# Patient Record
Sex: MALE | Race: WHITE | Employment: UNEMPLOYED | URBAN - METROPOLITAN AREA
[De-identification: names, ages, dates, MRNs, and addresses within clinical notes are randomized per-mention and may not be internally consistent; named-entity substitution may affect disease eponyms.]

---

## 2017-12-04 ENCOUNTER — HOSPITAL ENCOUNTER (INPATIENT)
Age: 42
LOS: 9 days | Discharge: NURSING, REHAB OR LONG-TERM CARE | DRG: 728 | End: 2017-12-13
Attending: FAMILY MEDICINE | Admitting: FAMILY MEDICINE
Payer: COMMERCIAL

## 2017-12-04 PROBLEM — N49.2 ABSCESS, SCROTUM: Status: ACTIVE | Noted: 2017-12-04

## 2017-12-04 LAB
ALBUMIN SERPL-MCNC: 3.4 G/DL (ref 3.5–5.2)
ALBUMIN/GLOBULIN RATIO: 0.9 (ref 1–2.5)
ALP BLD-CCNC: 67 U/L (ref 40–129)
ALT SERPL-CCNC: 20 U/L (ref 5–41)
ANION GAP SERPL CALCULATED.3IONS-SCNC: 15 MMOL/L (ref 9–17)
AST SERPL-CCNC: 15 U/L
BILIRUB SERPL-MCNC: 0.33 MG/DL (ref 0.3–1.2)
BUN BLDV-MCNC: 27 MG/DL (ref 6–20)
BUN/CREAT BLD: ABNORMAL (ref 9–20)
CALCIUM SERPL-MCNC: 8.1 MG/DL (ref 8.6–10.4)
CHLORIDE BLD-SCNC: 90 MMOL/L (ref 98–107)
CO2: 24 MMOL/L (ref 20–31)
CREAT SERPL-MCNC: 0.99 MG/DL (ref 0.7–1.2)
GFR AFRICAN AMERICAN: >60 ML/MIN
GFR NON-AFRICAN AMERICAN: >60 ML/MIN
GFR SERPL CREATININE-BSD FRML MDRD: ABNORMAL ML/MIN/{1.73_M2}
GFR SERPL CREATININE-BSD FRML MDRD: ABNORMAL ML/MIN/{1.73_M2}
GLUCOSE BLD-MCNC: 336 MG/DL (ref 70–99)
LACTIC ACID, WHOLE BLOOD: 1.8 MMOL/L (ref 0.7–2.1)
LACTIC ACID: NORMAL MMOL/L
POTASSIUM SERPL-SCNC: 4.3 MMOL/L (ref 3.7–5.3)
SODIUM BLD-SCNC: 129 MMOL/L (ref 135–144)
TOTAL PROTEIN: 7.4 G/DL (ref 6.4–8.3)
TROPONIN INTERP: NORMAL
TROPONIN T: <0.03 NG/ML

## 2017-12-04 PROCEDURE — 83036 HEMOGLOBIN GLYCOSYLATED A1C: CPT

## 2017-12-04 PROCEDURE — 1200000000 HC SEMI PRIVATE

## 2017-12-04 PROCEDURE — 80053 COMPREHEN METABOLIC PANEL: CPT

## 2017-12-04 PROCEDURE — 0T9B70Z DRAINAGE OF BLADDER WITH DRAINAGE DEVICE, VIA NATURAL OR ARTIFICIAL OPENING: ICD-10-PCS | Performed by: UROLOGY

## 2017-12-04 PROCEDURE — 36415 COLL VENOUS BLD VENIPUNCTURE: CPT

## 2017-12-04 PROCEDURE — 87040 BLOOD CULTURE FOR BACTERIA: CPT

## 2017-12-04 PROCEDURE — 83605 ASSAY OF LACTIC ACID: CPT

## 2017-12-04 PROCEDURE — 84484 ASSAY OF TROPONIN QUANT: CPT

## 2017-12-04 PROCEDURE — 6360000002 HC RX W HCPCS: Performed by: FAMILY MEDICINE

## 2017-12-04 PROCEDURE — 0V950ZZ DRAINAGE OF SCROTUM, OPEN APPROACH: ICD-10-PCS | Performed by: UROLOGY

## 2017-12-04 RX ORDER — SODIUM CHLORIDE 9 MG/ML
INJECTION, SOLUTION INTRAVENOUS CONTINUOUS
Status: DISCONTINUED | OUTPATIENT
Start: 2017-12-04 | End: 2017-12-05

## 2017-12-04 RX ORDER — MORPHINE SULFATE 2 MG/ML
2 INJECTION, SOLUTION INTRAMUSCULAR; INTRAVENOUS
Status: DISCONTINUED | OUTPATIENT
Start: 2017-12-04 | End: 2017-12-08

## 2017-12-04 RX ORDER — SODIUM CHLORIDE 0.9 % (FLUSH) 0.9 %
10 SYRINGE (ML) INJECTION EVERY 12 HOURS SCHEDULED
Status: DISCONTINUED | OUTPATIENT
Start: 2017-12-04 | End: 2017-12-13 | Stop reason: HOSPADM

## 2017-12-04 RX ORDER — HYDROCODONE BITARTRATE AND ACETAMINOPHEN 5; 325 MG/1; MG/1
2 TABLET ORAL EVERY 4 HOURS PRN
Status: DISCONTINUED | OUTPATIENT
Start: 2017-12-04 | End: 2017-12-05

## 2017-12-04 RX ORDER — HYDROCODONE BITARTRATE AND ACETAMINOPHEN 5; 325 MG/1; MG/1
1 TABLET ORAL EVERY 4 HOURS PRN
Status: DISCONTINUED | OUTPATIENT
Start: 2017-12-04 | End: 2017-12-05

## 2017-12-04 RX ORDER — ACETAMINOPHEN 325 MG/1
650 TABLET ORAL EVERY 4 HOURS PRN
Status: DISCONTINUED | OUTPATIENT
Start: 2017-12-04 | End: 2017-12-13 | Stop reason: HOSPADM

## 2017-12-04 RX ORDER — SODIUM CHLORIDE 0.9 % (FLUSH) 0.9 %
10 SYRINGE (ML) INJECTION PRN
Status: DISCONTINUED | OUTPATIENT
Start: 2017-12-04 | End: 2017-12-13 | Stop reason: HOSPADM

## 2017-12-04 RX ADMIN — MORPHINE SULFATE 2 MG: 2 INJECTION, SOLUTION INTRAMUSCULAR; INTRAVENOUS at 22:50

## 2017-12-04 ASSESSMENT — PAIN SCALES - GENERAL
PAINLEVEL_OUTOF10: 10
PAINLEVEL_OUTOF10: 10
PAINLEVEL_OUTOF10: 9

## 2017-12-04 ASSESSMENT — PAIN DESCRIPTION - ORIENTATION: ORIENTATION: RIGHT;LEFT

## 2017-12-04 ASSESSMENT — PAIN DESCRIPTION - PROGRESSION
CLINICAL_PROGRESSION: GRADUALLY WORSENING

## 2017-12-04 ASSESSMENT — PAIN DESCRIPTION - ONSET: ONSET: ON-GOING

## 2017-12-04 ASSESSMENT — PAIN DESCRIPTION - PAIN TYPE: TYPE: ACUTE PAIN

## 2017-12-04 ASSESSMENT — PAIN DESCRIPTION - LOCATION: LOCATION: SCROTUM

## 2017-12-04 ASSESSMENT — PAIN DESCRIPTION - FREQUENCY: FREQUENCY: CONTINUOUS

## 2017-12-04 ASSESSMENT — PAIN DESCRIPTION - DESCRIPTORS: DESCRIPTORS: STABBING

## 2017-12-05 ENCOUNTER — ANESTHESIA (OUTPATIENT)
Dept: OPERATING ROOM | Age: 42
DRG: 728 | End: 2017-12-05
Payer: COMMERCIAL

## 2017-12-05 ENCOUNTER — ANESTHESIA EVENT (OUTPATIENT)
Dept: OPERATING ROOM | Age: 42
DRG: 728 | End: 2017-12-05
Payer: COMMERCIAL

## 2017-12-05 VITALS — SYSTOLIC BLOOD PRESSURE: 127 MMHG | DIASTOLIC BLOOD PRESSURE: 52 MMHG | OXYGEN SATURATION: 98 %

## 2017-12-05 PROBLEM — E66.01 MORBID OBESITY WITH BMI OF 70 AND OVER, ADULT (HCC): Status: ACTIVE | Noted: 2017-12-05

## 2017-12-05 PROBLEM — J41.0 SIMPLE CHRONIC BRONCHITIS (HCC): Status: ACTIVE | Noted: 2017-12-05

## 2017-12-05 LAB
ABSOLUTE EOS #: 1.45 K/UL (ref 0–0.4)
ABSOLUTE IMMATURE GRANULOCYTE: 0.15 K/UL (ref 0–0.3)
ABSOLUTE LYMPH #: 0.87 K/UL (ref 1–4.8)
ABSOLUTE MONO #: 0.73 K/UL (ref 0.1–0.8)
ALBUMIN SERPL-MCNC: 3.1 G/DL (ref 3.5–5.2)
ALBUMIN/GLOBULIN RATIO: 0.7 (ref 1–2.5)
ALP BLD-CCNC: 65 U/L (ref 40–129)
ALT SERPL-CCNC: 18 U/L (ref 5–41)
ANION GAP SERPL CALCULATED.3IONS-SCNC: 15 MMOL/L (ref 9–17)
AST SERPL-CCNC: 14 U/L
BASOPHILS # BLD: 1 % (ref 0–2)
BASOPHILS ABSOLUTE: 0.15 K/UL (ref 0–0.2)
BILIRUB SERPL-MCNC: 0.51 MG/DL (ref 0.3–1.2)
BUN BLDV-MCNC: 28 MG/DL (ref 6–20)
BUN/CREAT BLD: ABNORMAL (ref 9–20)
CALCIUM IONIZED: 0.98 MMOL/L (ref 1.13–1.33)
CALCIUM SERPL-MCNC: 8 MG/DL (ref 8.6–10.4)
CHLORIDE BLD-SCNC: 94 MMOL/L (ref 98–107)
CO2: 23 MMOL/L (ref 20–31)
CREAT SERPL-MCNC: 0.98 MG/DL (ref 0.7–1.2)
DIFFERENTIAL TYPE: ABNORMAL
EOSINOPHILS RELATIVE PERCENT: 10 % (ref 1–4)
ESTIMATED AVERAGE GLUCOSE: 306 MG/DL
GFR AFRICAN AMERICAN: >60 ML/MIN
GFR NON-AFRICAN AMERICAN: >60 ML/MIN
GFR SERPL CREATININE-BSD FRML MDRD: ABNORMAL ML/MIN/{1.73_M2}
GFR SERPL CREATININE-BSD FRML MDRD: ABNORMAL ML/MIN/{1.73_M2}
GLUCOSE BLD-MCNC: 292 MG/DL (ref 75–110)
GLUCOSE BLD-MCNC: 306 MG/DL (ref 75–110)
GLUCOSE BLD-MCNC: 317 MG/DL (ref 75–110)
GLUCOSE BLD-MCNC: 336 MG/DL (ref 75–110)
GLUCOSE BLD-MCNC: 347 MG/DL (ref 70–99)
GLUCOSE BLD-MCNC: 355 MG/DL (ref 75–110)
GLUCOSE BLD-MCNC: 359 MG/DL (ref 75–110)
HBA1C MFR BLD: 12.3 % (ref 4–6)
HCT VFR BLD CALC: 37.4 % (ref 40.7–50.3)
HEMOGLOBIN: 12 G/DL (ref 13–17)
IMMATURE GRANULOCYTES: 1 %
INR BLD: 1.1
LACTIC ACID, WHOLE BLOOD: 1.2 MMOL/L (ref 0.7–2.1)
LACTIC ACID, WHOLE BLOOD: 1.3 MMOL/L (ref 0.7–2.1)
LACTIC ACID, WHOLE BLOOD: 1.5 MMOL/L (ref 0.7–2.1)
LACTIC ACID, WHOLE BLOOD: 1.7 MMOL/L (ref 0.7–2.1)
LACTIC ACID, WHOLE BLOOD: 2.3 MMOL/L (ref 0.7–2.1)
LACTIC ACID: ABNORMAL MMOL/L
LACTIC ACID: NORMAL MMOL/L
LYMPHOCYTES # BLD: 6 % (ref 24–44)
MAGNESIUM: 1.6 MG/DL (ref 1.6–2.6)
MCH RBC QN AUTO: 29.7 PG (ref 25.2–33.5)
MCHC RBC AUTO-ENTMCNC: 32.1 G/DL (ref 28.4–34.8)
MCV RBC AUTO: 92.6 FL (ref 82.6–102.9)
MONOCYTES # BLD: 5 % (ref 1–7)
MORPHOLOGY: NORMAL
PDW BLD-RTO: 12.5 % (ref 11.8–14.4)
PLATELET # BLD: 278 K/UL (ref 138–453)
PLATELET ESTIMATE: ABNORMAL
PMV BLD AUTO: 10.2 FL (ref 8.1–13.5)
POTASSIUM SERPL-SCNC: 4.2 MMOL/L (ref 3.7–5.3)
PROTHROMBIN TIME: 11.8 SEC (ref 9.4–12.6)
RBC # BLD: 4.04 M/UL (ref 4.21–5.77)
RBC # BLD: ABNORMAL 10*6/UL
SEG NEUTROPHILS: 77 % (ref 36–66)
SEGMENTED NEUTROPHILS ABSOLUTE COUNT: 11.15 K/UL (ref 1.8–7.7)
SODIUM BLD-SCNC: 132 MMOL/L (ref 135–144)
TOTAL PROTEIN: 7.3 G/DL (ref 6.4–8.3)
TROPONIN INTERP: NORMAL
TROPONIN T: <0.03 NG/ML
WBC # BLD: 14.5 K/UL (ref 3.5–11.3)
WBC # BLD: ABNORMAL 10*3/UL

## 2017-12-05 PROCEDURE — 2500000003 HC RX 250 WO HCPCS: Performed by: NURSE ANESTHETIST, CERTIFIED REGISTERED

## 2017-12-05 PROCEDURE — 87075 CULTR BACTERIA EXCEPT BLOOD: CPT

## 2017-12-05 PROCEDURE — 2500000003 HC RX 250 WO HCPCS: Performed by: UROLOGY

## 2017-12-05 PROCEDURE — 6370000000 HC RX 637 (ALT 250 FOR IP): Performed by: NURSE PRACTITIONER

## 2017-12-05 PROCEDURE — 3700000001 HC ADD 15 MINUTES (ANESTHESIA): Performed by: UROLOGY

## 2017-12-05 PROCEDURE — 6360000002 HC RX W HCPCS: Performed by: ANESTHESIOLOGY

## 2017-12-05 PROCEDURE — 7100000001 HC PACU RECOVERY - ADDTL 15 MIN: Performed by: UROLOGY

## 2017-12-05 PROCEDURE — 84484 ASSAY OF TROPONIN QUANT: CPT

## 2017-12-05 PROCEDURE — 85610 PROTHROMBIN TIME: CPT

## 2017-12-05 PROCEDURE — 82330 ASSAY OF CALCIUM: CPT

## 2017-12-05 PROCEDURE — 2580000003 HC RX 258: Performed by: FAMILY MEDICINE

## 2017-12-05 PROCEDURE — 94762 N-INVAS EAR/PLS OXIMTRY CONT: CPT

## 2017-12-05 PROCEDURE — 3700000000 HC ANESTHESIA ATTENDED CARE: Performed by: UROLOGY

## 2017-12-05 PROCEDURE — A6446 CONFORM BAND S W>=3" <5"/YD: HCPCS | Performed by: UROLOGY

## 2017-12-05 PROCEDURE — A6222 GAUZE <=16 IN NO W/SAL W/O B: HCPCS | Performed by: UROLOGY

## 2017-12-05 PROCEDURE — 6370000000 HC RX 637 (ALT 250 FOR IP): Performed by: FAMILY MEDICINE

## 2017-12-05 PROCEDURE — 6370000000 HC RX 637 (ALT 250 FOR IP): Performed by: ANESTHESIOLOGY

## 2017-12-05 PROCEDURE — 87070 CULTURE OTHR SPECIMN AEROBIC: CPT

## 2017-12-05 PROCEDURE — 83605 ASSAY OF LACTIC ACID: CPT

## 2017-12-05 PROCEDURE — 86403 PARTICLE AGGLUT ANTBDY SCRN: CPT

## 2017-12-05 PROCEDURE — 87205 SMEAR GRAM STAIN: CPT

## 2017-12-05 PROCEDURE — 6360000002 HC RX W HCPCS: Performed by: NURSE ANESTHETIST, CERTIFIED REGISTERED

## 2017-12-05 PROCEDURE — 6360000002 HC RX W HCPCS: Performed by: FAMILY MEDICINE

## 2017-12-05 PROCEDURE — 85025 COMPLETE CBC W/AUTO DIFF WBC: CPT

## 2017-12-05 PROCEDURE — 87086 URINE CULTURE/COLONY COUNT: CPT

## 2017-12-05 PROCEDURE — 2580000003 HC RX 258: Performed by: NURSE ANESTHETIST, CERTIFIED REGISTERED

## 2017-12-05 PROCEDURE — 36415 COLL VENOUS BLD VENIPUNCTURE: CPT

## 2017-12-05 PROCEDURE — 99222 1ST HOSP IP/OBS MODERATE 55: CPT | Performed by: FAMILY MEDICINE

## 2017-12-05 PROCEDURE — 94660 CPAP INITIATION&MGMT: CPT

## 2017-12-05 PROCEDURE — 3600000015 HC SURGERY LEVEL 5 ADDTL 15MIN: Performed by: UROLOGY

## 2017-12-05 PROCEDURE — 3600000005 HC SURGERY LEVEL 5 BASE: Performed by: UROLOGY

## 2017-12-05 PROCEDURE — 7100000000 HC PACU RECOVERY - FIRST 15 MIN: Performed by: UROLOGY

## 2017-12-05 PROCEDURE — 82947 ASSAY GLUCOSE BLOOD QUANT: CPT

## 2017-12-05 PROCEDURE — 80053 COMPREHEN METABOLIC PANEL: CPT

## 2017-12-05 PROCEDURE — 1200000000 HC SEMI PRIVATE

## 2017-12-05 PROCEDURE — 83735 ASSAY OF MAGNESIUM: CPT

## 2017-12-05 RX ORDER — OXYCODONE AND ACETAMINOPHEN 7.5; 325 MG/1; MG/1
1 TABLET ORAL EVERY 8 HOURS PRN
Status: DISCONTINUED | OUTPATIENT
Start: 2017-12-05 | End: 2017-12-05 | Stop reason: SDUPTHER

## 2017-12-05 RX ORDER — INSULIN GLARGINE 100 [IU]/ML
55 INJECTION, SOLUTION SUBCUTANEOUS 2 TIMES DAILY
Status: DISCONTINUED | OUTPATIENT
Start: 2017-12-05 | End: 2017-12-06

## 2017-12-05 RX ORDER — ATENOLOL 50 MG/1
50 TABLET ORAL DAILY
Status: DISCONTINUED | OUTPATIENT
Start: 2017-12-05 | End: 2017-12-05

## 2017-12-05 RX ORDER — METOLAZONE 2.5 MG/1
2.5 TABLET ORAL DAILY
Status: DISCONTINUED | OUTPATIENT
Start: 2017-12-05 | End: 2017-12-13 | Stop reason: HOSPADM

## 2017-12-05 RX ORDER — DILTIAZEM HYDROCHLORIDE 240 MG/1
240 CAPSULE, COATED, EXTENDED RELEASE ORAL DAILY
Status: DISCONTINUED | OUTPATIENT
Start: 2017-12-05 | End: 2017-12-08

## 2017-12-05 RX ORDER — ALBUTEROL SULFATE 90 UG/1
2 AEROSOL, METERED RESPIRATORY (INHALATION) EVERY 4 HOURS PRN
COMMUNITY

## 2017-12-05 RX ORDER — IPRATROPIUM BROMIDE AND ALBUTEROL SULFATE 2.5; .5 MG/3ML; MG/3ML
1 SOLUTION RESPIRATORY (INHALATION) 4 TIMES DAILY
COMMUNITY

## 2017-12-05 RX ORDER — DIPHENHYDRAMINE HYDROCHLORIDE 50 MG/ML
12.5 INJECTION INTRAMUSCULAR; INTRAVENOUS
Status: DISCONTINUED | OUTPATIENT
Start: 2017-12-05 | End: 2017-12-05 | Stop reason: HOSPADM

## 2017-12-05 RX ORDER — OXYCODONE HYDROCHLORIDE AND ACETAMINOPHEN 5; 325 MG/1; MG/1
2 TABLET ORAL EVERY 4 HOURS PRN
Status: DISCONTINUED | OUTPATIENT
Start: 2017-12-05 | End: 2017-12-13 | Stop reason: HOSPADM

## 2017-12-05 RX ORDER — DEXTROSE MONOHYDRATE 50 MG/ML
100 INJECTION, SOLUTION INTRAVENOUS PRN
Status: DISCONTINUED | OUTPATIENT
Start: 2017-12-05 | End: 2017-12-13 | Stop reason: HOSPADM

## 2017-12-05 RX ORDER — DILTIAZEM HYDROCHLORIDE 240 MG/1
240 CAPSULE, COATED, EXTENDED RELEASE ORAL DAILY
Status: DISCONTINUED | OUTPATIENT
Start: 2017-12-05 | End: 2017-12-05

## 2017-12-05 RX ORDER — DEXTROSE MONOHYDRATE 25 G/50ML
12.5 INJECTION, SOLUTION INTRAVENOUS PRN
Status: DISCONTINUED | OUTPATIENT
Start: 2017-12-05 | End: 2017-12-13 | Stop reason: HOSPADM

## 2017-12-05 RX ORDER — MIDAZOLAM HYDROCHLORIDE 1 MG/ML
INJECTION INTRAMUSCULAR; INTRAVENOUS PRN
Status: DISCONTINUED | OUTPATIENT
Start: 2017-12-05 | End: 2017-12-05 | Stop reason: SDUPTHER

## 2017-12-05 RX ORDER — CALCIUM CARBONATE 200(500)MG
500 TABLET,CHEWABLE ORAL 3 TIMES DAILY PRN
Status: DISCONTINUED | OUTPATIENT
Start: 2017-12-05 | End: 2017-12-13 | Stop reason: HOSPADM

## 2017-12-05 RX ORDER — FENTANYL CITRATE 50 UG/ML
INJECTION, SOLUTION INTRAMUSCULAR; INTRAVENOUS PRN
Status: DISCONTINUED | OUTPATIENT
Start: 2017-12-05 | End: 2017-12-05 | Stop reason: SDUPTHER

## 2017-12-05 RX ORDER — DILTIAZEM HYDROCHLORIDE 240 MG/1
240 CAPSULE, COATED, EXTENDED RELEASE ORAL DAILY
COMMUNITY
End: 2022-09-22

## 2017-12-05 RX ORDER — ALBUTEROL SULFATE 2.5 MG/3ML
2.5 SOLUTION RESPIRATORY (INHALATION) EVERY 4 HOURS
COMMUNITY

## 2017-12-05 RX ORDER — SODIUM CHLORIDE, SODIUM LACTATE, POTASSIUM CHLORIDE, CALCIUM CHLORIDE 600; 310; 30; 20 MG/100ML; MG/100ML; MG/100ML; MG/100ML
INJECTION, SOLUTION INTRAVENOUS CONTINUOUS PRN
Status: DISCONTINUED | OUTPATIENT
Start: 2017-12-05 | End: 2017-12-05 | Stop reason: SDUPTHER

## 2017-12-05 RX ORDER — METOLAZONE 2.5 MG/1
2.5 TABLET ORAL DAILY
COMMUNITY
End: 2022-09-22

## 2017-12-05 RX ORDER — PROPOFOL 10 MG/ML
INJECTION, EMULSION INTRAVENOUS CONTINUOUS PRN
Status: DISCONTINUED | OUTPATIENT
Start: 2017-12-05 | End: 2017-12-05 | Stop reason: SDUPTHER

## 2017-12-05 RX ORDER — FUROSEMIDE 40 MG/1
40 TABLET ORAL 2 TIMES DAILY
Status: DISCONTINUED | OUTPATIENT
Start: 2017-12-05 | End: 2017-12-13 | Stop reason: HOSPADM

## 2017-12-05 RX ORDER — INSULIN GLARGINE 100 [IU]/ML
50 INJECTION, SOLUTION SUBCUTANEOUS 2 TIMES DAILY
Status: DISCONTINUED | OUTPATIENT
Start: 2017-12-05 | End: 2017-12-05

## 2017-12-05 RX ORDER — OXYCODONE HYDROCHLORIDE AND ACETAMINOPHEN 5; 325 MG/1; MG/1
1 TABLET ORAL EVERY 4 HOURS PRN
Status: DISCONTINUED | OUTPATIENT
Start: 2017-12-05 | End: 2017-12-13 | Stop reason: HOSPADM

## 2017-12-05 RX ORDER — CETIRIZINE HYDROCHLORIDE, PSEUDOEPHEDRINE HYDROCHLORIDE 5; 120 MG/1; MG/1
1 TABLET, FILM COATED, EXTENDED RELEASE ORAL 2 TIMES DAILY
COMMUNITY

## 2017-12-05 RX ORDER — POTASSIUM CHLORIDE 20 MEQ/1
20 TABLET, EXTENDED RELEASE ORAL 2 TIMES DAILY
COMMUNITY
End: 2022-09-22

## 2017-12-05 RX ORDER — PROMETHAZINE HYDROCHLORIDE 25 MG/ML
6.25 INJECTION, SOLUTION INTRAMUSCULAR; INTRAVENOUS
Status: DISCONTINUED | OUTPATIENT
Start: 2017-12-05 | End: 2017-12-05 | Stop reason: HOSPADM

## 2017-12-05 RX ORDER — HYDROCODONE BITARTRATE AND ACETAMINOPHEN 5; 325 MG/1; MG/1
1 TABLET ORAL EVERY 6 HOURS PRN
Status: ON HOLD | COMMUNITY
End: 2017-12-05

## 2017-12-05 RX ORDER — LABETALOL HYDROCHLORIDE 5 MG/ML
INJECTION, SOLUTION INTRAVENOUS PRN
Status: DISCONTINUED | OUTPATIENT
Start: 2017-12-05 | End: 2017-12-05 | Stop reason: SDUPTHER

## 2017-12-05 RX ORDER — KETAMINE HYDROCHLORIDE 10 MG/ML
INJECTION, SOLUTION INTRAMUSCULAR; INTRAVENOUS PRN
Status: DISCONTINUED | OUTPATIENT
Start: 2017-12-05 | End: 2017-12-05 | Stop reason: SDUPTHER

## 2017-12-05 RX ORDER — FENTANYL CITRATE 50 UG/ML
50 INJECTION, SOLUTION INTRAMUSCULAR; INTRAVENOUS EVERY 5 MIN PRN
Status: DISCONTINUED | OUTPATIENT
Start: 2017-12-05 | End: 2017-12-05 | Stop reason: HOSPADM

## 2017-12-05 RX ORDER — FUROSEMIDE 40 MG/1
40 TABLET ORAL 2 TIMES DAILY
COMMUNITY
End: 2022-09-22

## 2017-12-05 RX ORDER — OXYCODONE AND ACETAMINOPHEN 7.5; 325 MG/1; MG/1
1 TABLET ORAL EVERY 8 HOURS PRN
Status: ON HOLD | COMMUNITY
End: 2017-12-08

## 2017-12-05 RX ORDER — LIDOCAINE HYDROCHLORIDE 10 MG/ML
INJECTION, SOLUTION EPIDURAL; INFILTRATION; INTRACAUDAL; PERINEURAL PRN
Status: DISCONTINUED | OUTPATIENT
Start: 2017-12-05 | End: 2017-12-05 | Stop reason: HOSPADM

## 2017-12-05 RX ORDER — LOSARTAN POTASSIUM 50 MG/1
100 TABLET ORAL DAILY
Status: DISCONTINUED | OUTPATIENT
Start: 2017-12-05 | End: 2017-12-13 | Stop reason: HOSPADM

## 2017-12-05 RX ORDER — ATENOLOL 50 MG/1
50 TABLET ORAL DAILY
Status: DISCONTINUED | OUTPATIENT
Start: 2017-12-05 | End: 2017-12-07

## 2017-12-05 RX ORDER — INSULIN GLARGINE 100 [IU]/ML
50 INJECTION, SOLUTION SUBCUTANEOUS 2 TIMES DAILY
Status: ON HOLD | COMMUNITY
End: 2017-12-08 | Stop reason: HOSPADM

## 2017-12-05 RX ORDER — ATENOLOL 50 MG/1
50 TABLET ORAL DAILY
Status: ON HOLD | COMMUNITY
End: 2017-12-08 | Stop reason: HOSPADM

## 2017-12-05 RX ORDER — LOSARTAN POTASSIUM 100 MG/1
100 TABLET ORAL DAILY
COMMUNITY
End: 2022-09-22

## 2017-12-05 RX ORDER — ONDANSETRON 2 MG/ML
4 INJECTION INTRAMUSCULAR; INTRAVENOUS
Status: DISCONTINUED | OUTPATIENT
Start: 2017-12-05 | End: 2017-12-05 | Stop reason: HOSPADM

## 2017-12-05 RX ORDER — FENTANYL CITRATE 50 UG/ML
25 INJECTION, SOLUTION INTRAMUSCULAR; INTRAVENOUS EVERY 5 MIN PRN
Status: DISCONTINUED | OUTPATIENT
Start: 2017-12-05 | End: 2017-12-05 | Stop reason: HOSPADM

## 2017-12-05 RX ORDER — POTASSIUM CHLORIDE 20 MEQ/1
20 TABLET, EXTENDED RELEASE ORAL 2 TIMES DAILY
Status: DISCONTINUED | OUTPATIENT
Start: 2017-12-05 | End: 2017-12-13 | Stop reason: HOSPADM

## 2017-12-05 RX ORDER — NICOTINE POLACRILEX 4 MG
15 LOZENGE BUCCAL PRN
Status: DISCONTINUED | OUTPATIENT
Start: 2017-12-05 | End: 2017-12-13 | Stop reason: HOSPADM

## 2017-12-05 RX ADMIN — MORPHINE SULFATE 2 MG: 2 INJECTION, SOLUTION INTRAMUSCULAR; INTRAVENOUS at 18:38

## 2017-12-05 RX ADMIN — MORPHINE SULFATE 2 MG: 2 INJECTION, SOLUTION INTRAMUSCULAR; INTRAVENOUS at 07:32

## 2017-12-05 RX ADMIN — ATENOLOL 50 MG: 50 TABLET ORAL at 21:57

## 2017-12-05 RX ADMIN — FENTANYL CITRATE 50 MCG: 50 INJECTION INTRAMUSCULAR; INTRAVENOUS at 15:27

## 2017-12-05 RX ADMIN — VANCOMYCIN HYDROCHLORIDE 1500 MG: 10 INJECTION, POWDER, LYOPHILIZED, FOR SOLUTION INTRAVENOUS at 01:42

## 2017-12-05 RX ADMIN — MORPHINE SULFATE 2 MG: 2 INJECTION, SOLUTION INTRAMUSCULAR; INTRAVENOUS at 10:11

## 2017-12-05 RX ADMIN — MORPHINE SULFATE 2 MG: 2 INJECTION, SOLUTION INTRAMUSCULAR; INTRAVENOUS at 01:43

## 2017-12-05 RX ADMIN — MORPHINE SULFATE 2 MG: 2 INJECTION, SOLUTION INTRAMUSCULAR; INTRAVENOUS at 21:55

## 2017-12-05 RX ADMIN — FENTANYL CITRATE 25 MCG: 50 INJECTION INTRAMUSCULAR; INTRAVENOUS at 15:44

## 2017-12-05 RX ADMIN — FENTANYL CITRATE 25 MCG: 50 INJECTION INTRAMUSCULAR; INTRAVENOUS at 14:15

## 2017-12-05 RX ADMIN — SODIUM CHLORIDE, POTASSIUM CHLORIDE, SODIUM LACTATE AND CALCIUM CHLORIDE: 600; 310; 30; 20 INJECTION, SOLUTION INTRAVENOUS at 13:49

## 2017-12-05 RX ADMIN — MEROPENEM 1 G: 1 INJECTION, POWDER, FOR SOLUTION INTRAVENOUS at 07:43

## 2017-12-05 RX ADMIN — OXYCODONE HYDROCHLORIDE AND ACETAMINOPHEN 2 TABLET: 5; 325 TABLET ORAL at 01:14

## 2017-12-05 RX ADMIN — DILTIAZEM HYDROCHLORIDE 240 MG: 240 CAPSULE, COATED, EXTENDED RELEASE ORAL at 21:58

## 2017-12-05 RX ADMIN — LABETALOL HYDROCHLORIDE 5 MG: 5 INJECTION, SOLUTION INTRAVENOUS at 14:02

## 2017-12-05 RX ADMIN — FENTANYL CITRATE 50 MCG: 50 INJECTION INTRAMUSCULAR; INTRAVENOUS at 13:46

## 2017-12-05 RX ADMIN — INSULIN LISPRO 4 UNITS: 100 INJECTION, SOLUTION INTRAVENOUS; SUBCUTANEOUS at 01:43

## 2017-12-05 RX ADMIN — KETAMINE HYDROCHLORIDE 50 MG: 10 INJECTION INTRAMUSCULAR; INTRAVENOUS at 14:13

## 2017-12-05 RX ADMIN — FENTANYL CITRATE 25 MCG: 50 INJECTION INTRAMUSCULAR; INTRAVENOUS at 14:23

## 2017-12-05 RX ADMIN — KETAMINE HYDROCHLORIDE 50 MG: 10 INJECTION INTRAMUSCULAR; INTRAVENOUS at 13:53

## 2017-12-05 RX ADMIN — ANTACID TABLETS 500 MG: 500 TABLET, CHEWABLE ORAL at 22:21

## 2017-12-05 RX ADMIN — MIDAZOLAM HYDROCHLORIDE 1 MG: 1 INJECTION, SOLUTION INTRAMUSCULAR; INTRAVENOUS at 13:48

## 2017-12-05 RX ADMIN — VANCOMYCIN HYDROCHLORIDE 1500 MG: 10 INJECTION, POWDER, LYOPHILIZED, FOR SOLUTION INTRAVENOUS at 16:48

## 2017-12-05 RX ADMIN — INSULIN GLARGINE 50 UNITS: 100 INJECTION, SOLUTION SUBCUTANEOUS at 09:57

## 2017-12-05 RX ADMIN — MORPHINE SULFATE 2 MG: 2 INJECTION, SOLUTION INTRAMUSCULAR; INTRAVENOUS at 12:15

## 2017-12-05 RX ADMIN — Medication 10 ML: at 01:01

## 2017-12-05 RX ADMIN — MEROPENEM 1 G: 1 INJECTION, POWDER, FOR SOLUTION INTRAVENOUS at 21:56

## 2017-12-05 RX ADMIN — FENTANYL CITRATE 50 MCG: 50 INJECTION INTRAMUSCULAR; INTRAVENOUS at 16:14

## 2017-12-05 RX ADMIN — OXYCODONE HYDROCHLORIDE AND ACETAMINOPHEN 2 TABLET: 5; 325 TABLET ORAL at 16:59

## 2017-12-05 RX ADMIN — POTASSIUM CHLORIDE 20 MEQ: 20 TABLET, EXTENDED RELEASE ORAL at 22:02

## 2017-12-05 RX ADMIN — INSULIN LISPRO 4 UNITS: 100 INJECTION, SOLUTION INTRAVENOUS; SUBCUTANEOUS at 16:59

## 2017-12-05 RX ADMIN — PROPOFOL 50 MCG/KG/MIN: 10 INJECTION, EMULSION INTRAVENOUS at 13:53

## 2017-12-05 RX ADMIN — FENTANYL CITRATE 50 MCG: 50 INJECTION INTRAMUSCULAR; INTRAVENOUS at 15:14

## 2017-12-05 RX ADMIN — OXYCODONE HYDROCHLORIDE AND ACETAMINOPHEN 2 TABLET: 5; 325 TABLET ORAL at 21:55

## 2017-12-05 RX ADMIN — INSULIN LISPRO 10 UNITS: 100 INJECTION, SOLUTION INTRAVENOUS; SUBCUTANEOUS at 09:56

## 2017-12-05 RX ADMIN — FUROSEMIDE 40 MG: 40 TABLET ORAL at 21:58

## 2017-12-05 RX ADMIN — SODIUM CHLORIDE: 9 INJECTION, SOLUTION INTRAVENOUS at 01:00

## 2017-12-05 RX ADMIN — INSULIN HUMAN 10 UNITS: 100 INJECTION, SOLUTION PARENTERAL at 15:39

## 2017-12-05 RX ADMIN — Medication 10 ML: at 22:03

## 2017-12-05 RX ADMIN — VANCOMYCIN HYDROCHLORIDE 1500 MG: 10 INJECTION, POWDER, LYOPHILIZED, FOR SOLUTION INTRAVENOUS at 10:11

## 2017-12-05 RX ADMIN — LABETALOL HYDROCHLORIDE 10 MG: 5 INJECTION, SOLUTION INTRAVENOUS at 14:05

## 2017-12-05 RX ADMIN — LINAGLIPTIN 5 MG: 5 TABLET, FILM COATED ORAL at 19:26

## 2017-12-05 RX ADMIN — OXYCODONE HYDROCHLORIDE AND ACETAMINOPHEN 2 TABLET: 5; 325 TABLET ORAL at 05:16

## 2017-12-05 RX ADMIN — ENOXAPARIN SODIUM 40 MG: 40 INJECTION SUBCUTANEOUS at 21:56

## 2017-12-05 RX ADMIN — MEROPENEM 1 G: 1 INJECTION, POWDER, FOR SOLUTION INTRAVENOUS at 01:01

## 2017-12-05 RX ADMIN — INSULIN LISPRO 5 UNITS: 100 INJECTION, SOLUTION INTRAVENOUS; SUBCUTANEOUS at 21:57

## 2017-12-05 RX ADMIN — INSULIN GLARGINE 55 UNITS: 100 INJECTION, SOLUTION SUBCUTANEOUS at 21:56

## 2017-12-05 ASSESSMENT — PULMONARY FUNCTION TESTS
PIF_VALUE: 1

## 2017-12-05 ASSESSMENT — PAIN DESCRIPTION - PAIN TYPE
TYPE: ACUTE PAIN

## 2017-12-05 ASSESSMENT — PAIN DESCRIPTION - PROGRESSION
CLINICAL_PROGRESSION: GRADUALLY WORSENING
CLINICAL_PROGRESSION: NOT CHANGED
CLINICAL_PROGRESSION: GRADUALLY WORSENING

## 2017-12-05 ASSESSMENT — ENCOUNTER SYMPTOMS
SORE THROAT: 0
WHEEZING: 0
DIARRHEA: 0
CONSTIPATION: 0
SHORTNESS OF BREATH: 0
ABDOMINAL PAIN: 0
BLOOD IN STOOL: 0
VOICE CHANGE: 0
VOMITING: 0
COUGH: 0
SHORTNESS OF BREATH: 0
SINUS PRESSURE: 0
NAUSEA: 0

## 2017-12-05 ASSESSMENT — PAIN SCALES - GENERAL
PAINLEVEL_OUTOF10: 6
PAINLEVEL_OUTOF10: 6
PAINLEVEL_OUTOF10: 8
PAINLEVEL_OUTOF10: 9
PAINLEVEL_OUTOF10: 7
PAINLEVEL_OUTOF10: 6
PAINLEVEL_OUTOF10: 7
PAINLEVEL_OUTOF10: 8
PAINLEVEL_OUTOF10: 8
PAINLEVEL_OUTOF10: 10
PAINLEVEL_OUTOF10: 7
PAINLEVEL_OUTOF10: 5
PAINLEVEL_OUTOF10: 6
PAINLEVEL_OUTOF10: 6
PAINLEVEL_OUTOF10: 10
PAINLEVEL_OUTOF10: 8
PAINLEVEL_OUTOF10: 6
PAINLEVEL_OUTOF10: 7
PAINLEVEL_OUTOF10: 9
PAINLEVEL_OUTOF10: 10
PAINLEVEL_OUTOF10: 8

## 2017-12-05 ASSESSMENT — PAIN DESCRIPTION - DESCRIPTORS: DESCRIPTORS: ACHING;TENDER

## 2017-12-05 ASSESSMENT — PAIN - FUNCTIONAL ASSESSMENT: PAIN_FUNCTIONAL_ASSESSMENT: 0-10

## 2017-12-05 ASSESSMENT — PAIN DESCRIPTION - ONSET: ONSET: ON-GOING

## 2017-12-05 ASSESSMENT — PAIN DESCRIPTION - FREQUENCY: FREQUENCY: CONTINUOUS

## 2017-12-05 ASSESSMENT — PAIN DESCRIPTION - LOCATION
LOCATION: KNEE
LOCATION: KNEE
LOCATION: SCROTUM

## 2017-12-05 ASSESSMENT — PAIN DESCRIPTION - ORIENTATION
ORIENTATION: RIGHT;LEFT
ORIENTATION: RIGHT

## 2017-12-05 ASSESSMENT — LIFESTYLE VARIABLES: SMOKING_STATUS: 0

## 2017-12-05 NOTE — PROGRESS NOTES
Pharmacy Note  Vancomycin Consult    Deidra Schofield is a 36 y.o. male started on Vancomycin for scrotum abscess/sepsis; consult received from Dr. Kaya Lara  to manage therapy. Also receiving the following antibiotics: Meropenem. Patient Active Problem List   Diagnosis    Abscess, scrotum    Severe uncontrolled diabetes mellitus ([address]       Allergies:  Review of patient's allergies indicates not on file. Temp max: 98.2    Recent Labs      12/04/17   2258   BUN  27*       Recent Labs      12/04/17   2258   CREATININE  0.99       No results for input(s): WBC in the last 72 hours. No intake or output data in the 24 hours ending 12/05/17 0002    Culture Date      Source                       Results      Ht Readings from Last 1 Encounters:   12/04/17 6' 1\" (1.854 m)        Wt Readings from Last 1 Encounters:   12/04/17 (!) 574 lb 8 oz (260.6 kg)         Body mass index is 75.8 kg/m². Estimated Creatinine Clearance: 207 mL/min (based on SCr of 0.99 mg/dL). Assessment/Plan:  Patient received Vancomycin 2000 mg IV x1 dose at 16:49 on 12/4/17 from another medical facility per CRISTI Smallwood, Will initiate vancomycin 1500 mg IV every 8 hours. Timing of trough level will be determined based on culture results, renal function, and clinical response. Thank you for the consult. Will continue to follow. Velvet Prior Pharm. D.    12/5/2017  12:08 AM

## 2017-12-05 NOTE — CONSULTS
5-325 MG per tablet 1 tablet, 1 tablet, Oral, Q4H PRN **OR** oxyCODONE-acetaminophen (PERCOCET) 5-325 MG per tablet 2 tablet, 2 tablet, Oral, Q4H PRN, Dannie Chopra, NP    glucose (GLUTOSE) 40 % oral gel 15 g, 15 g, Oral, PRN, Dannie Riling, NP    dextrose 50 % solution 12.5 g, 12.5 g, Intravenous, PRN, Dannie Riling, NP    glucagon (rDNA) injection 1 mg, 1 mg, Intramuscular, PRN, Dannie Riling, NP    dextrose 5 % solution, 100 mL/hr, Intravenous, PRN, Dannie Riling, NP    insulin lispro (HUMALOG) injection vial 0-12 Units, 0-12 Units, Subcutaneous, TID WC, Dannie Chopra, NP    insulin lispro (HUMALOG) injection vial 0-6 Units, 0-6 Units, Subcutaneous, Nightly, Dannie Chopra, NP    0.9 % sodium chloride infusion, , Intravenous, Continuous, Hamida Song MD    sodium chloride flush 0.9 % injection 10 mL, 10 mL, Intravenous, 2 times per day, Hamida Song MD    sodium chloride flush 0.9 % injection 10 mL, 10 mL, Intravenous, PRN, Hamida Song MD    acetaminophen (TYLENOL) tablet 650 mg, 650 mg, Oral, Q4H PRN, Hamida Song MD    morphine injection 2 mg, 2 mg, Intravenous, Q2H PRN, Hamida Song MD, 2 mg at 12/04/17 2250    enoxaparin (LOVENOX) injection 40 mg, 40 mg, Subcutaneous, Daily, Hamida Song MD    meropenem (MERREM) 1 g in sterile water 20 mL IV syringe, 1 g, Intravenous, Q8H, Hamida Song MD    Allergies: Allergies not on file    Social History:   Social History     Social History    Marital status:      Spouse name: N/A    Number of children: N/A    Years of education: N/A     Occupational History    Not on file.      Social History Main Topics    Smoking status: Not on file    Smokeless tobacco: Not on file    Alcohol use Not on file    Drug use: Unknown    Sexual activity: Not on file     Other Topics Concern    Not on file     Social History Narrative    No narrative on file       Family History:  No family history on file. REVIEW OF SYSTEMS:  A comprehensive 14 point review of systems was obtained. Constitutional: No fatigue  Eyes: No blurry vision  Ears, nose, mouth, throat, face: No ringing in the ears; no facial droop. Respiratory: No cough or cold. Cardiovascular: No palpitations  Gastrointestinal: No diarrhea or constipation. Genitourinary: No burning with urination  Integument/Skin: No rashes  Hematologic/Lymphatic: No easy bruising  Musculoskeletal: No muscle pains  Neurologic: No weakness in the extremities. Psychiatric: No depression or suicidal thoughts. Endocrine: No heat or cold intolerances. Allergic/Immunologic: No current seasonal allergies; no skin hives. Physical Exam:    This a 36 y.o. male   Vitals:    12/05/17 0023   BP:    Pulse:    Resp: 20   Temp:    SpO2:      Constitutional: Patient in no acute distress, morbidly obese habitus  Neuro: alert and oriented to person place and time. Head: Atraumatic and normocephalic. Neck: Trachea midline   Ext: 2+ radial pulses bilaterally. Psych: Mood and affect normal.  Skin: No rashes or bruising present. Lungs: Respiratory effort normal, seen with CPAP mask on  Cardiovascular:  Regular rhythm. Abdomen: Soft, non-tender, non-distended. Neither side has CVA tenderness on exam.  Bladder non-tender and not distended. Lymphatics: no palpable lymphadenopathy. Penis normal and circumcised. Buried penis on exam. Exam limited due to habitus  Urethral meatus normal  Scrotal exam: Limited due to habitus. Fluctuant area dependent R lateral hemiscrotum with associated asymmetric soft tissue thickening and edema, R>L. No signs of necrosis or fasciitis at present. No drainage noted on exam. Fissues in R hemiscrotum noted. No foul odors. Testicles and epididymides nonpalpable due to limited exam and patient discomfort    Labs:  No results for input(s): WBC, HGB, HCT, MCV, PLT in the last 72 hours.   Recent Labs      12/04/17   2258   NA  129*

## 2017-12-05 NOTE — PLAN OF CARE
[facility]    Second Visit Note  For more detailed information please refer to the progress note of the day      12/5/2017    5:55 PM    Name:   Reji Tobias  MRN:     [id number]     Acct:      [de-identified]   Room:   0324/0324-01   Day:  1  Admit Date:  12/4/2017  9:35 PM    PCP:   Waylon Carbajal MD  Code Status:  Full Code        Pt vitals were reviewed   New labs were reviewed   Patient was seen    Updated plan :     1. Patient s/p surgery. Uncontrolled blood sugar. Add tradjenta . Continue antibiotics . lantus increased .          Nelson Blackmon MD  12/5/2017  5:55 PM

## 2017-12-05 NOTE — PROGRESS NOTES
[facility]    HISTORY AND PHYSICAL EXAMINATION            Date:   12/5/2017  Patient name:  Lauren Guerra  Date of admission:  12/4/2017  9:35 PM  MRN:   [id number]  Account:  [de-identified]  YOB: 1975  PCP:    Gay Alonso MD  Room:   0324/0324-01  Code Status:    Full Code    Chief Complaint:     Uncontrolled type 2 diabetes mellitus with hyperglycemia  Scrotal wound    History Obtained From:     patient, electronic medical record    History of Present Illness: The patient is a 36 y.o. Non-/non  male who presents with Scrotal pain, swelling and he is admitted to the hospital for the management of Scrotal abscess, hyperglycemia and uncontrolled type 2 diabetes mellitus, morbid obesity. Patient was transferred from [facility] emergency room where he presented with scrotal wound. He has been having swelling in his scrotum for last few weeks. Pain was constant and worse with sitting. Patient denied any fever, chills. He denied any discharge. He does not have any dysuria, frequency, hematuria. Patient is morbidly obese and has underlying type 2 diabetes mellitus which is uncontrolled. Patient's initial evaluation over [facility] showed blood sugar more than 450 without ketoacidosis. He was found to have scrotal abscess. Due to his weight patient was transferred to [facility] for urological evaluation.          Past Medical History:     Past Medical History:   Diagnosis Date    Arthritis     of the knees    Asthma     Blood circulation, collateral     COPD (chronic obstructive pulmonary disease) ([address]     Hypertension     Morbid obesity ([address]     Pneumonia     Uncontrolled diabetes mellitus with hyperglycemia ([facility]         Past Surgical History:     Past Surgical History:   Procedure Laterality Date    LEG DEBRIDEMENT Right 2012    Wound debridement to R Leg wound        Medications Prior to Admission:     Prior to Admission medications    Medication Sig Start Date End Date Taking? Authorizing Provider   Glucosamine-Chondroitin--528-732 MG TABS Take 2 tablets by mouth 2 times daily   Yes Historical Provider, MD   insulin glargine (LANTUS) 100 UNIT/ML injection vial Inject 50 Units into the skin 2 times daily   Yes Historical Provider, MD   furosemide (LASIX) 40 MG tablet Take 40 mg by mouth 2 times daily   Yes Historical Provider, MD   oxyCODONE-acetaminophen (PERCOCET) 7.5-325 MG per tablet Take 1 tablet by mouth every 8 hours as needed for Pain .    Yes Historical Provider, MD   albuterol sulfate  (90 Base) MCG/ACT inhaler Inhale 2 puffs into the lungs every 4 hours as needed for Wheezing   Yes Historical Provider, MD   cetirizine-psuedoephedrine (ZYRTEC-D) 5-120 MG per extended release tablet Take 1 tablet by mouth 2 times daily   Yes Historical Provider, MD   albuterol (PROVENTIL) (2.5 MG/3ML) 0.083% nebulizer solution Take 2.5 mg by nebulization every 4 hours   Yes Historical Provider, MD   losartan (COZAAR) 100 MG tablet Take 100 mg by mouth daily   Yes Historical Provider, MD   metolazone (ZAROXOLYN) 2.5 MG tablet Take 2.5 mg by mouth daily   Yes Historical Provider, MD   potassium chloride (KLOR-CON M) 20 MEQ extended release tablet Take 20 mEq by mouth 2 times daily   Yes Historical Provider, MD   ipratropium-albuterol (DUONEB) 0.5-2.5 (3) MG/3ML SOLN nebulizer solution Inhale 1 vial into the lungs 4 times daily   Yes Historical Provider, MD   apixaban (ELIQUIS) 5 MG TABS tablet Take 5 mg by mouth 2 times daily   Yes Historical Provider, MD   insulin lispro (HUMALOG) 100 UNIT/ML injection vial Inject into the skin 3 times daily (before meals)   Yes Historical Provider, MD   insulin lispro protamine & lispro (HUMALOG MIX) (75-25) 100 UNIT per ML SUSP injection vial Inject into the skin 3 times daily (with meals)   Yes Historical Provider, MD   HYDROcodone-acetaminophen ([facility]) 5-325 MG per tablet Take 1 tablet by mouth every 6 hours as needed for Pain . Yes Historical Provider, MD   atenolol (TENORMIN) 50 MG tablet Take 50 mg by mouth daily   Yes Historical Provider, MD   diltiazem (CARDIZEM CD) 240 MG extended release capsule Take 240 mg by mouth daily   Yes Historical Provider, MD        Allergies:     Zosyn [piperacillin sod-tazobactam so]    Social History:     Tobacco:    reports that he quit smoking about 5 months ago. His smoking use included Cigarettes. He started smoking about 2 years ago. He smoked 1.00 pack per day. He has never used smokeless tobacco.  Alcohol:      reports that he drinks alcohol. Drug Use:  reports that he does not use drugs. Family History:     Family History   Problem Relation Age of Onset    Asthma Father     Cancer Father     Other Father     Early Death Paternal Grandfather        Review of Systems:     Positive and Negative as described in HPI. Review of Systems   Constitutional: Negative for activity change, appetite change, chills, fatigue, fever and unexpected weight change. HENT: Negative for congestion, mouth sores, postnasal drip, sinus pressure, sore throat and voice change. Eyes: Negative for visual disturbance. Respiratory: Negative for cough, shortness of breath and wheezing. Cardiovascular: Negative for chest pain and palpitations. Gastrointestinal: Negative for abdominal pain, blood in stool, constipation, diarrhea, nausea and vomiting. Endocrine: Negative for polyuria. Genitourinary: Positive for scrotal swelling and testicular pain. Negative for difficulty urinating, discharge, dysuria, enuresis, flank pain, frequency, penile pain, penile swelling and urgency. Musculoskeletal: Negative for arthralgias, joint swelling and myalgias. Neurological: Negative for dizziness, tremors, speech difficulty, light-headedness and headaches.        Physical Exam:   /73   Pulse 94   Temp 98.1 °F (36.7 °C) (Oral)   Resp 20 Troponin    Collection Time: 12/04/17 10:58 PM   Result Value Ref Range    Troponin T <0.03 <0.03 ng/mL    Troponin Interp         CULTURE BLOOD #1    Collection Time: 12/04/17 10:58 PM   Result Value Ref Range    Specimen Description . BLOOD     Special Requests L HAND 4 ML     Culture NO GROWTH 6 HOURS     Culture       Tony Careyab [id number] [facility], [address] [place] [address] [phone]    Status Pending    CULTURE BLOOD #2    Collection Time: 12/04/17 10:58 PM   Result Value Ref Range    Specimen Description . BLOOD     Special Requests R ARM 0.05     Culture NO GROWTH 6 HOURS     Culture       Tony Schwab [id number] [facility], [address] [place] [address] [phone]    Status Pending    Comprehensive metabolic panel    Collection Time: 12/04/17 10:58 PM   Result Value Ref Range    Glucose 336 (H) 70 - 99 mg/dL    BUN 27 (H) 6 - 20 mg/dL    CREATININE 0.99 0.70 - 1.20 mg/dL    Bun/Cre Ratio NOT REPORTED 9 - 20    Calcium 8.1 (L) 8.6 - 10.4 mg/dL    Sodium 129 (L) 135 - 144 mmol/L    Potassium 4.3 3.7 - 5.3 mmol/L    Chloride 90 (L) 98 - 107 mmol/L    CO2 24 20 - 31 mmol/L    Anion Gap 15 9 - 17 mmol/L    Alkaline Phosphatase 67 40 - 129 U/L    ALT 20 5 - 41 U/L    AST 15 <40 U/L    Total Bilirubin 0.33 0.3 - 1.2 mg/dL    Total Protein 7.4 6.4 - 8.3 g/dL    Alb 3.4 (L) 3.5 - 5.2 g/dL    Albumin/Globulin Ratio 0.9 (L) 1.0 - 2.5    GFR Non-African American >60 >60 mL/min    GFR African American >60 >60 mL/min    GFR Comment          GFR Staging NOT REPORTED    POC Glucose Fingerstick    Collection Time: 12/05/17 12:36 AM   Result Value Ref Range    POC Glucose 317 (H) 75 - 110 mg/dL   Lactic acid, plasma    Collection Time: 12/05/17  3:54 AM   Result Value Ref Range    Lactic Acid NOT REPORTED mmol/L    Lactic Acid, Whole Blood 1.7 0.7 - 2.1 mmol/L   Troponin    Collection Time: 12/05/17  5:55 AM   Result Value Ref Range    Troponin T <0.03 <0.03 ng/mL    Troponin Interp         Protime-INR    Collection Time: 12/05/17  5:55 AM   Result Value Ref Range    Protime 11.8 9.4 - 12.6 sec    INR 1.1    CBC auto differential    Collection Time: 12/05/17  5:55 AM   Result Value Ref Range    WBC 14.5 (H) 3.5 - 11.3 k/uL    RBC 4.04 (L) 4.21 - 5.77 m/uL    Hemoglobin 12.0 (L) 13.0 - 17.0 g/dL    Hematocrit 37.4 (L) 40.7 - 50.3 %    MCV 92.6 82.6 - 102.9 fL    MCH 29.7 25.2 - 33.5 pg    MCHC 32.1 28.4 - 34.8 g/dL    RDW 12.5 11.8 - 14.4 %    Platelets 697 497 - 447 k/uL    MPV 10.2 8.1 - 13.5 fL    Differential Type NOT REPORTED     Seg Neutrophils Pending %    Lymphocytes Pending %    Monocytes Pending %    Eosinophils % Pending %    Basophils Pending %    Immature Granulocytes Pending 0 %    Segs Absolute Pending k/uL    Absolute Lymph # Pending k/uL    Absolute Mono # Pending k/uL    Absolute Eos # Pending k/uL    Basophils # Pending 0.0 - 0.2 k/uL    Absolute Immature Granulocyte Pending 0.00 - 0.30 k/uL    WBC Morphology NOT REPORTED     RBC Morphology NOT REPORTED     Platelet Estimate NOT REPORTED    Comprehensive metabolic panel    Collection Time: 12/05/17  5:55 AM   Result Value Ref Range    Glucose 347 (H) 70 - 99 mg/dL    BUN 28 (H) 6 - 20 mg/dL    CREATININE 0.98 0.70 - 1.20 mg/dL    Bun/Cre Ratio NOT REPORTED 9 - 20    Calcium 8.0 (L) 8.6 - 10.4 mg/dL    Sodium 132 (L) 135 - 144 mmol/L    Potassium 4.2 3.7 - 5.3 mmol/L    Chloride 94 (L) 98 - 107 mmol/L    CO2 23 20 - 31 mmol/L    Anion Gap 15 9 - 17 mmol/L    Alkaline Phosphatase 65 40 - 129 U/L    ALT 18 5 - 41 U/L    AST 14 <40 U/L    Total Bilirubin 0.51 0.3 - 1.2 mg/dL    Total Protein 7.3 6.4 - 8.3 g/dL    Alb 3.1 (L) 3.5 - 5.2 g/dL    Albumin/Globulin Ratio 0.7 (L) 1.0 - 2.5    GFR Non-African American >60 >60 mL/min    GFR African American >60 >60 mL/min    GFR Comment          GFR Staging NOT REPORTED    Ionized Calcium    Collection Time: 12/05/17  5:55 AM   Result Value Ref Range    Calcium, Ion 0.98 (L) 1.13 - 1.33 mmol/L   Magnesium    Collection Time: 12/05/17  5:55 AM

## 2017-12-05 NOTE — PROGRESS NOTES
Zuleima Meehan is a 36 y.o. male patient.     Current Facility-Administered Medications   Medication Dose Route Frequency Provider Last Rate Last Dose    vancomycin (VANCOCIN) 1,500 mg in dextrose 5 % 250 mL IVPB  1,500 mg Intravenous Q8H Xuan Saavedra  mL/hr at 12/05/17 0142 1,500 mg at 12/05/17 0142    vancomycin (VANCOCIN) intermittent dosing (placeholder)   Other RX Placeholder Xuan Saavedra MD        oxyCODONE-acetaminophen (PERCOCET) 5-325 MG per tablet 1 tablet  1 tablet Oral Q4H PRN Ja Waller NP        Or    oxyCODONE-acetaminophen (PERCOCET) 5-325 MG per tablet 2 tablet  2 tablet Oral Q4H PRN Ja Waller NP   2 tablet at 12/05/17 0114    glucose (GLUTOSE) 40 % oral gel 15 g  15 g Oral PRN Ja Waller NP        dextrose 50 % solution 12.5 g  12.5 g Intravenous PRN Ja Waller NP        glucagon (rDNA) injection 1 mg  1 mg Intramuscular PRN Ja Waller NP        dextrose 5 % solution  100 mL/hr Intravenous PRN Ja Waller NP        insulin lispro (HUMALOG) injection vial 0-12 Units  0-12 Units Subcutaneous TID WC Ja Waller NP        insulin lispro (HUMALOG) injection vial 0-6 Units  0-6 Units Subcutaneous Nightly Ja Waller NP   4 Units at 12/05/17 0143    0.9 % sodium chloride infusion   Intravenous Continuous Xuan Saavedra  mL/hr at 12/05/17 0100      sodium chloride flush 0.9 % injection 10 mL  10 mL Intravenous 2 times per day Xuan Saavedra MD   10 mL at 12/05/17 0101    sodium chloride flush 0.9 % injection 10 mL  10 mL Intravenous PRN Xuan Saavedra MD        acetaminophen (TYLENOL) tablet 650 mg  650 mg Oral Q4H PRN Xuan Saavedra MD        morphine injection 2 mg  2 mg Intravenous Q2H PRN Xuan Saavedra MD   2 mg at 12/05/17 0143    enoxaparin (LOVENOX) injection 40 mg  40 mg Subcutaneous Daily Xuan Saavedra MD        meropenem (MERREM) 1 g in sterile water 20 mL IV

## 2017-12-05 NOTE — ANESTHESIA PRE PROCEDURE
Department of Anesthesiology  Preprocedure Note       Name:  Elizabeth Barba   Age:  36 y.o.  :  1975                                          MRN:  [id number]         Date:  2017      Surgeon: Akira Solitario):  Howie Lee MD    Procedure: Procedure(s):  SCROTAL INCISION AND DRAINAGE    Medications prior to admission:   Prior to Admission medications    Medication Sig Start Date End Date Taking? Authorizing Provider   Glucosamine-Chondroitin-MSM 364463-419 MG TABS Take 2 tablets by mouth 2 times daily   Yes Historical Provider, MD   insulin glargine (LANTUS) 100 UNIT/ML injection vial Inject 50 Units into the skin 2 times daily   Yes Historical Provider, MD   furosemide (LASIX) 40 MG tablet Take 40 mg by mouth 2 times daily   Yes Historical Provider, MD   oxyCODONE-acetaminophen (PERCOCET) 7.5-325 MG per tablet Take 1 tablet by mouth every 8 hours as needed for Pain .    Yes Historical Provider, MD   albuterol sulfate  (90 Base) MCG/ACT inhaler Inhale 2 puffs into the lungs every 4 hours as needed for Wheezing   Yes Historical Provider, MD   cetirizine-psuedoephedrine (ZYRTEC-D) 5-120 MG per extended release tablet Take 1 tablet by mouth 2 times daily   Yes Historical Provider, MD   albuterol (PROVENTIL) (2.5 MG/3ML) 0.083% nebulizer solution Take 2.5 mg by nebulization every 4 hours   Yes Historical Provider, MD   losartan (COZAAR) 100 MG tablet Take 100 mg by mouth daily   Yes Historical Provider, MD   metolazone (ZAROXOLYN) 2.5 MG tablet Take 2.5 mg by mouth daily   Yes Historical Provider, MD   potassium chloride (KLOR-CON M) 20 MEQ extended release tablet Take 20 mEq by mouth 2 times daily   Yes Historical Provider, MD   ipratropium-albuterol (DUONEB) 0.5-2.5 (3) MG/3ML SOLN nebulizer solution Inhale 1 vial into the lungs 4 times daily   Yes Historical Provider, MD   apixaban (ELIQUIS) 5 MG TABS tablet Take 5 mg by mouth 2 times daily   Yes Historical Provider, MD   insulin lispro (HUMALOG) 100 Amanda Tom MD        [facility]] diltiazem (CARDIZEM CD) extended release capsule 240 mg  240 mg Oral Daily Mandy Medrano MD        [facility]] furosemide (LASIX) tablet 40 mg  40 mg Oral BID Mandy Medrano MD        [facility]] insulin glargine (LANTUS) injection vial 50 Units  50 Units Subcutaneous BID Mandy Medrano MD   50 Units at 12/05/17 0957    [[facility] Hold] losartan (COZAAR) tablet 100 mg  100 mg Oral Daily Mandy Medrano MD        [facility]] metolazone (ZAROXOLYN) tablet 2.5 mg  2.5 mg Oral Daily Mandy Medrano MD        [facility]] potassium chloride (KLOR-CON M) extended release tablet 20 mEq  20 mEq Oral BID Mandy Medrano MD        [facility]] enoxaparin (LOVENOX) injection 40 mg  40 mg Subcutaneous BID Mandy Medrano MD        [facility]] sodium chloride flush 0.9 % injection 10 mL  10 mL Intravenous 2 times per day Mandy Medrano MD   10 mL at 12/05/17 0101    [[facility] Hold] sodium chloride flush 0.9 % injection 10 mL  10 mL Intravenous PRN Mandy Medrano MD        [facility]] acetaminophen (TYLENOL) tablet 650 mg  650 mg Oral Q4H PRN Mandy Medrano MD        [facility]] morphine injection 2 mg  2 mg Intravenous Q2H PRN Mandy Medrano MD   2 mg at 12/05/17 1215    [[facility] Hold] meropenem (MERREM) 1 g in sterile water 20 mL IV syringe  1 g Intravenous Q8H Mandy Medrano MD   1 g at 12/05/17 0743       Allergies:     Allergies   Allergen Reactions    Zosyn [Piperacillin Sod-Tazobactam So] Itching and Other (See Comments)     Redness       Problem List:    Patient Active Problem List   Diagnosis Code    Abscess, scrotum N49.2    Severe uncontrolled diabetes mellitus ([facility] E11.65    Morbid obesity with BMI of 70 and over, adult ([facility] E66.01, Z68.45    Simple chronic bronchitis ([facility] J41.0       Past Medical History:        Diagnosis Date    Arthritis     of the knees    Asthma     Blood circulation, collateral     COPD (chronic obstructive pulmonary disease) ([facility]     Hypertension     Morbid obesity Results   Component Value Date    PROTIME 11.8 12/05/2017    INR 1.1 12/05/2017       HCG (If Applicable): No results found for: PREGTESTUR, PREGSERUM, HCG, HCGQUANT     ABGs: No results found for: PHART, PO2ART, ZPX5YST, MNN5BZD, BEART, B7RNFBLC     Type & Screen (If Applicable):  No results found for: LABABO, [address]    Anesthesia Evaluation  Patient summary reviewed   history of anesthetic complications: difficult airway. Airway: Mallampati: III  TM distance: >3 FB   Neck ROM: limited  Mouth opening: > = 3 FB Dental:          Pulmonary:   (+) COPD:  sleep apnea: on CPAP,  decreased breath sounds,  asthma:     (-) pneumonia, shortness of breath, recent URI and not a current smoker                          ROS comment: Quit smoking June 2017   Cardiovascular:  Exercise tolerance: poor (<4 METS),   (+) hypertension:, dysrhythmias: atrial fibrillation, CHF (possible):,     (-) pacemaker, valvular problems/murmurs, past MI, CAD, CABG/stent and  angina    ECG reviewed  Rhythm: irregular  Rate: normal           Beta Blocker:  Dose within 24 Hrs         Neuro/Psych:   (+) neuromuscular disease (diabetic neuropathy of feet):,    (-) seizures, TIA, CVA and headaches           GI/Hepatic/Renal:   (+) morbid obesity     (-) hiatal hernia, GERD, PUD, hepatitis, liver disease, no renal disease and bowel prep       Endo/Other:    (+) Type II DM, , blood dyscrasia: anticoagulation therapy, arthritis: OA., .    (-) hypothyroidism, hyperthyroidism, no Type I DM               Abdominal:   (+) obese,         Vascular:                                      Anesthesia Plan      MAC     ASA 3       Induction: intravenous. MIPS: Postoperative opioids intended and Prophylactic antiemetics administered. Anesthetic plan and risks discussed with patient. Use of blood products discussed with patient whom. Plan discussed with CRNA.                   Billy Herrera MD   12/5/2017

## 2017-12-05 NOTE — PROGRESS NOTES
Smoking Cessation - topics covered   []  Health Risks  []  Benefits of Quitting   []  Smoking Cessation  []  Patient has no history of tobacco use  [x]  Patient is former smoker. Patient quit in 2017. [x]  No need for tobacco cessation education. []  Booklet given  []  Patient verbalizes understanding. []  Patient denies need for tobacco cessation education.   Meenu Rajan  8:19 AM

## 2017-12-05 NOTE — H&P
Vandana Quiles, Anderson, Elder Polo  Urology H&P        Patient:  Leah Perez  MRN: [id number]  YOB: 1975     CHIEF COMPLAINT:  Scrotal abscess     HISTORY OF PRESENT ILLNESS:   The patient is a 36 y.o. male who presents with scrotal abscess, recurrent for 6 months. Patient was transferred from [facility] for continued scrotal abscess and poorly controlled DM with hyperglycemia. He states starting 6 months ago, he noticed a painful, swollen, fluctuant area in the R hemiscrotum that spontaneously opened and drained \"bloody\" fluid. He has seen Dr. Pravin Hooks for this, who also lanced it at one point. He states that it has been waxing and waning in size. He has had courses of keflex, bactrim, and cefoxitin which seem to help at first, but after courses end the swelling and pain return. He denies any nausea, intolerance po intake, associated burning dysuria or other complaints. He does have past history of UTIs. Scrotal swelling now affecting his voiding, as he has to  shower to urinate because of spraying. After swelling returned on bactrim course completion one week ago, he presented to [facility] and was transferred here.  He states that doctors at [facility] told him they could not help him because of comorbidities, as he had difficulties with intubation and anesthesia previously.      Patient's old records, notes and chart reviewed and summarized above.     Past Medical History:    Past Medical History        Past Medical History:   Diagnosis Date    Morbid obesity ([facility]      Uncontrolled diabetes mellitus with hyperglycemia ([facility]              Past Surgical History:    Past Surgical History   No past surgical history on file.        Medications:      Current Medication      Current Facility-Administered Medications:     vancomycin (VANCOCIN) 1,500 mg in dextrose 5 % 250 mL IVPB, 1,500 mg, Intravenous, Q8H, Marvin East MD    vancomycin (VANCOCIN) intermittent dosing (placeholder), , Other, Damian Wilks MD    oxyCODONE-acetaminophen (PERCOCET) 5-325 MG per tablet 1 tablet, 1 tablet, Oral, Q4H PRN **OR** oxyCODONE-acetaminophen (PERCOCET) 5-325 MG per tablet 2 tablet, 2 tablet, Oral, Q4H PRN, Oliver Fang NP    glucose (GLUTOSE) 40 % oral gel 15 g, 15 g, Oral, PRN, Oliver Fang NP    dextrose 50 % solution 12.5 g, 12.5 g, Intravenous, PRN, Oliver Fang NP    glucagon (rDNA) injection 1 mg, 1 mg, Intramuscular, PRN, Oliver Fang NP    dextrose 5 % solution, 100 mL/hr, Intravenous, PRN, Oliver Fang NP    insulin lispro (HUMALOG) injection vial 0-12 Units, 0-12 Units, Subcutaneous, TID WC, Oliver Fang NP    insulin lispro (HUMALOG) injection vial 0-6 Units, 0-6 Units, Subcutaneous, Nightly, Oliver Fang NP    0.9 % sodium chloride infusion, , Intravenous, Continuous, Jose Peck MD    sodium chloride flush 0.9 % injection 10 mL, 10 mL, Intravenous, 2 times per day, Jose Peck MD    sodium chloride flush 0.9 % injection 10 mL, 10 mL, Intravenous, PRN, Jose Peck MD    acetaminophen (TYLENOL) tablet 650 mg, 650 mg, Oral, Q4H PRN, Jose Peck MD    morphine injection 2 mg, 2 mg, Intravenous, Q2H PRN, Jose Peck MD, 2 mg at 12/04/17 2250    enoxaparin (LOVENOX) injection 40 mg, 40 mg, Subcutaneous, Daily, Jose Peck MD    meropenem (MERREM) 1 g in sterile water 20 mL IV syringe, 1 g, Intravenous, Q8H, Jose Peck MD        Allergies:   Allergies not on file     Social History:   Social History   Social History            Social History    Marital status:        Spouse name: N/A    Number of children: N/A    Years of education: N/A          Occupational History    Not on file.           Social History Main Topics    Smoking status: Not on file    Smokeless tobacco: Not on file    Alcohol use Not on file    Drug use: Unknown    Sexual activity: Not on file     to limited exam and patient discomfort     Labs:  No results for input(s): WBC, HGB, HCT, MCV, PLT in the last 72 hours. Recent Labs      12/04/17   2258   NA  129*   K  4.3   CL  90*   CO2  24   BUN  27*   CREATININE  0.99         No results for input(s): COLORU, PHUR, LABCAST, WBCUA, RBCUA, MUCUS, TRICHOMONAS, YEAST, BACTERIA, CLARITYU, SPECGRAV, LEUKOCYTESUR, UROBILINOGEN, BILIRUBINUR, BLOODU in the last 72 hours.     Invalid input(s): NITRATE, GLUCOSEUKETONESUAMORPHOUS     Culture Results:  @[facility]@        -----------------------------------------------------------------  Imaging Results:  No results found.     Assessment and Plan   Impression:        Patient Active Problem List   Diagnosis    Abscess, scrotum    Severe uncontrolled diabetes mellitus ([facility]               Plan:    To OR for I&D of scrotal abscess

## 2017-12-05 NOTE — PROGRESS NOTES
[address]  Occupational Therapy Not Seen Note    Patient not available for Occupational Therapy due to:    [] Testing:    [] Hemodialysis    [] Blood Transfusion in Progress    []Refusal by Patient:    [] Surgery/Procedure:    [x] Strict Bedrest    [] Sedation    [] Spine Precautions     [] Pt being transferred to palliative care at this time. Spoke with pt/family and OT services to be defered. [] Pt independent with functional mobility and functional tasks.  Pt with no OT acute care needs at this time, will defer OT eval.    Next Scheduled Treatment: Will check back 12/6/2017    Signature: Juan J Mata OTR/L

## 2017-12-05 NOTE — PROGRESS NOTES
Pt states he is unwilling to comply with his bedrest order. Pt back and forth to and from chair/bed independently. Call light was placed on bedside table where he was sitting in chair. Pt called out crying that his scrotum was bleeding and his IV was \"hanging out\". Pt c/o \"telelmEtry monitor not replaced by nurse, no one responding to call light, broken bed, NPO order\" Nurse supervisor, Martine Ji, to room to assess situation- as patient called him. Writer and nurse supervisor in room and addressed all concerns. CALL LIGHT WITHIN REACH WHEN WRITER LEFT FLOOR- ADVISED PATIENT TO CALL OUT IF NEEDED AND THAT I WILL BE BACK WITHIN THE HOUR FOR HOURLY ROUNDS TO ADDRESS ADDITIONAL CONCERNS THEN.

## 2017-12-06 LAB
ABSOLUTE EOS #: 1.68 K/UL (ref 0–0.44)
ABSOLUTE IMMATURE GRANULOCYTE: 0.13 K/UL (ref 0–0.3)
ABSOLUTE LYMPH #: 1.35 K/UL (ref 1.1–3.7)
ABSOLUTE MONO #: 1.27 K/UL (ref 0.1–1.2)
ALBUMIN SERPL-MCNC: 3 G/DL (ref 3.5–5.2)
ALBUMIN/GLOBULIN RATIO: 0.7 (ref 1–2.5)
ALP BLD-CCNC: 63 U/L (ref 40–129)
ALT SERPL-CCNC: 18 U/L (ref 5–41)
ANION GAP SERPL CALCULATED.3IONS-SCNC: 13 MMOL/L (ref 9–17)
ANION GAP SERPL CALCULATED.3IONS-SCNC: 13 MMOL/L (ref 9–17)
AST SERPL-CCNC: 13 U/L
BASOPHILS # BLD: 1 % (ref 0–2)
BASOPHILS ABSOLUTE: 0.08 K/UL (ref 0–0.2)
BILIRUB SERPL-MCNC: 0.4 MG/DL (ref 0.3–1.2)
BUN BLDV-MCNC: 27 MG/DL (ref 6–20)
BUN BLDV-MCNC: 31 MG/DL (ref 6–20)
BUN/CREAT BLD: ABNORMAL (ref 9–20)
BUN/CREAT BLD: ABNORMAL (ref 9–20)
CALCIUM IONIZED: 1.03 MMOL/L (ref 1.13–1.33)
CALCIUM SERPL-MCNC: 8.1 MG/DL (ref 8.6–10.4)
CALCIUM SERPL-MCNC: 8.1 MG/DL (ref 8.6–10.4)
CHLORIDE BLD-SCNC: 90 MMOL/L (ref 98–107)
CHLORIDE BLD-SCNC: 94 MMOL/L (ref 98–107)
CO2: 25 MMOL/L (ref 20–31)
CO2: 25 MMOL/L (ref 20–31)
CREAT SERPL-MCNC: 0.89 MG/DL (ref 0.7–1.2)
CREAT SERPL-MCNC: 1.09 MG/DL (ref 0.7–1.2)
CULTURE: NO GROWTH
CULTURE: NORMAL
DIFFERENTIAL TYPE: ABNORMAL
EOSINOPHILS RELATIVE PERCENT: 14 % (ref 1–4)
GFR AFRICAN AMERICAN: >60 ML/MIN
GFR AFRICAN AMERICAN: >60 ML/MIN
GFR NON-AFRICAN AMERICAN: >60 ML/MIN
GFR NON-AFRICAN AMERICAN: >60 ML/MIN
GFR SERPL CREATININE-BSD FRML MDRD: ABNORMAL ML/MIN/{1.73_M2}
GLUCOSE BLD-MCNC: 343 MG/DL (ref 75–110)
GLUCOSE BLD-MCNC: 348 MG/DL (ref 70–99)
GLUCOSE BLD-MCNC: 378 MG/DL (ref 70–99)
GLUCOSE BLD-MCNC: 388 MG/DL (ref 75–110)
GLUCOSE BLD-MCNC: 405 MG/DL (ref 75–110)
GLUCOSE BLD-MCNC: 429 MG/DL (ref 75–110)
HCT VFR BLD CALC: 37.4 % (ref 40.7–50.3)
HEMOGLOBIN: 11.8 G/DL (ref 13–17)
IMMATURE GRANULOCYTES: 1 %
LACTIC ACID, WHOLE BLOOD: 1.3 MMOL/L (ref 0.7–2.1)
LACTIC ACID, WHOLE BLOOD: 1.3 MMOL/L (ref 0.7–2.1)
LACTIC ACID, WHOLE BLOOD: 1.4 MMOL/L (ref 0.7–2.1)
LACTIC ACID, WHOLE BLOOD: 1.9 MMOL/L (ref 0.7–2.1)
LACTIC ACID: NORMAL MMOL/L
LYMPHOCYTES # BLD: 11 % (ref 24–43)
Lab: NORMAL
MAGNESIUM: 1.7 MG/DL (ref 1.6–2.6)
MAGNESIUM: 1.8 MG/DL (ref 1.6–2.6)
MCH RBC QN AUTO: 29.2 PG (ref 25.2–33.5)
MCHC RBC AUTO-ENTMCNC: 31.6 G/DL (ref 28.4–34.8)
MCV RBC AUTO: 92.6 FL (ref 82.6–102.9)
MONOCYTES # BLD: 10 % (ref 3–12)
PDW BLD-RTO: 12.5 % (ref 11.8–14.4)
PLATELET # BLD: 284 K/UL (ref 138–453)
PLATELET ESTIMATE: ABNORMAL
PMV BLD AUTO: 9.7 FL (ref 8.1–13.5)
POTASSIUM SERPL-SCNC: 4.3 MMOL/L (ref 3.7–5.3)
POTASSIUM SERPL-SCNC: 4.5 MMOL/L (ref 3.7–5.3)
RBC # BLD: 4.04 M/UL (ref 4.21–5.77)
RBC # BLD: ABNORMAL 10*6/UL
SEG NEUTROPHILS: 63 % (ref 36–65)
SEGMENTED NEUTROPHILS ABSOLUTE COUNT: 7.77 K/UL (ref 1.5–8.1)
SODIUM BLD-SCNC: 128 MMOL/L (ref 135–144)
SODIUM BLD-SCNC: 132 MMOL/L (ref 135–144)
SPECIMEN DESCRIPTION: NORMAL
STATUS: NORMAL
TOTAL PROTEIN: 7.1 G/DL (ref 6.4–8.3)
TROPONIN INTERP: NORMAL
TROPONIN T: <0.03 NG/ML
VANCOMYCIN TROUGH DATE LAST DOSE: NORMAL
VANCOMYCIN TROUGH DATE LAST DOSE: NORMAL
VANCOMYCIN TROUGH DOSE AMOUNT: NORMAL
VANCOMYCIN TROUGH DOSE AMOUNT: NORMAL
VANCOMYCIN TROUGH TIME LAST DOSE: NORMAL
VANCOMYCIN TROUGH TIME LAST DOSE: NORMAL
VANCOMYCIN TROUGH: 14.7 UG/ML (ref 10–20)
VANCOMYCIN TROUGH: 15.9 UG/ML (ref 10–20)
WBC # BLD: 12.3 K/UL (ref 3.5–11.3)
WBC # BLD: ABNORMAL 10*3/UL

## 2017-12-06 PROCEDURE — 80048 BASIC METABOLIC PNL TOTAL CA: CPT

## 2017-12-06 PROCEDURE — 85025 COMPLETE CBC W/AUTO DIFF WBC: CPT

## 2017-12-06 PROCEDURE — 6370000000 HC RX 637 (ALT 250 FOR IP): Performed by: FAMILY MEDICINE

## 2017-12-06 PROCEDURE — 93005 ELECTROCARDIOGRAM TRACING: CPT

## 2017-12-06 PROCEDURE — 82947 ASSAY GLUCOSE BLOOD QUANT: CPT

## 2017-12-06 PROCEDURE — 80202 ASSAY OF VANCOMYCIN: CPT

## 2017-12-06 PROCEDURE — 83605 ASSAY OF LACTIC ACID: CPT

## 2017-12-06 PROCEDURE — 6360000002 HC RX W HCPCS: Performed by: FAMILY MEDICINE

## 2017-12-06 PROCEDURE — 82330 ASSAY OF CALCIUM: CPT

## 2017-12-06 PROCEDURE — 6370000000 HC RX 637 (ALT 250 FOR IP): Performed by: NURSE PRACTITIONER

## 2017-12-06 PROCEDURE — 76937 US GUIDE VASCULAR ACCESS: CPT

## 2017-12-06 PROCEDURE — 36415 COLL VENOUS BLD VENIPUNCTURE: CPT

## 2017-12-06 PROCEDURE — 2580000003 HC RX 258: Performed by: FAMILY MEDICINE

## 2017-12-06 PROCEDURE — 94660 CPAP INITIATION&MGMT: CPT

## 2017-12-06 PROCEDURE — 84484 ASSAY OF TROPONIN QUANT: CPT

## 2017-12-06 PROCEDURE — 1200000000 HC SEMI PRIVATE

## 2017-12-06 PROCEDURE — 99232 SBSQ HOSP IP/OBS MODERATE 35: CPT | Performed by: FAMILY MEDICINE

## 2017-12-06 PROCEDURE — 94762 N-INVAS EAR/PLS OXIMTRY CONT: CPT

## 2017-12-06 PROCEDURE — 83735 ASSAY OF MAGNESIUM: CPT

## 2017-12-06 PROCEDURE — 80053 COMPREHEN METABOLIC PANEL: CPT

## 2017-12-06 RX ORDER — MAGNESIUM HYDROXIDE 1200 MG/15ML
LIQUID ORAL
Status: DISPENSED
Start: 2017-12-06 | End: 2017-12-06

## 2017-12-06 RX ORDER — INSULIN GLARGINE 100 [IU]/ML
70 INJECTION, SOLUTION SUBCUTANEOUS 2 TIMES DAILY
Status: DISCONTINUED | OUTPATIENT
Start: 2017-12-06 | End: 2017-12-07

## 2017-12-06 RX ORDER — SULFAMETHOXAZOLE AND TRIMETHOPRIM 800; 160 MG/1; MG/1
1 TABLET ORAL EVERY 12 HOURS SCHEDULED
Status: DISCONTINUED | OUTPATIENT
Start: 2017-12-06 | End: 2017-12-09

## 2017-12-06 RX ORDER — MAGNESIUM SULFATE 1 G/100ML
2 INJECTION INTRAVENOUS PRN
Status: DISPENSED | OUTPATIENT
Start: 2017-12-06 | End: 2017-12-07

## 2017-12-06 RX ORDER — POTASSIUM CHLORIDE 7.45 MG/ML
10 INJECTION INTRAVENOUS PRN
Status: DISCONTINUED | OUTPATIENT
Start: 2017-12-06 | End: 2017-12-13 | Stop reason: HOSPADM

## 2017-12-06 RX ORDER — POTASSIUM CHLORIDE 20 MEQ/1
40 TABLET, EXTENDED RELEASE ORAL PRN
Status: DISCONTINUED | OUTPATIENT
Start: 2017-12-06 | End: 2017-12-13 | Stop reason: HOSPADM

## 2017-12-06 RX ORDER — POTASSIUM CHLORIDE 20MEQ/15ML
40 LIQUID (ML) ORAL PRN
Status: DISCONTINUED | OUTPATIENT
Start: 2017-12-06 | End: 2017-12-13 | Stop reason: HOSPADM

## 2017-12-06 RX ADMIN — MORPHINE SULFATE 2 MG: 2 INJECTION, SOLUTION INTRAMUSCULAR; INTRAVENOUS at 06:39

## 2017-12-06 RX ADMIN — MORPHINE SULFATE 2 MG: 2 INJECTION, SOLUTION INTRAMUSCULAR; INTRAVENOUS at 12:13

## 2017-12-06 RX ADMIN — INSULIN LISPRO 12 UNITS: 100 INJECTION, SOLUTION INTRAVENOUS; SUBCUTANEOUS at 12:14

## 2017-12-06 RX ADMIN — POTASSIUM CHLORIDE 20 MEQ: 20 TABLET, EXTENDED RELEASE ORAL at 09:06

## 2017-12-06 RX ADMIN — ENOXAPARIN SODIUM 40 MG: 40 INJECTION SUBCUTANEOUS at 21:12

## 2017-12-06 RX ADMIN — OXYCODONE HYDROCHLORIDE AND ACETAMINOPHEN 2 TABLET: 5; 325 TABLET ORAL at 19:41

## 2017-12-06 RX ADMIN — METOLAZONE 2.5 MG: 2.5 TABLET ORAL at 09:06

## 2017-12-06 RX ADMIN — DILTIAZEM HYDROCHLORIDE 240 MG: 240 CAPSULE, COATED, EXTENDED RELEASE ORAL at 09:06

## 2017-12-06 RX ADMIN — FUROSEMIDE 40 MG: 40 TABLET ORAL at 09:06

## 2017-12-06 RX ADMIN — MORPHINE SULFATE 2 MG: 2 INJECTION, SOLUTION INTRAMUSCULAR; INTRAVENOUS at 03:59

## 2017-12-06 RX ADMIN — OXYCODONE HYDROCHLORIDE AND ACETAMINOPHEN 2 TABLET: 5; 325 TABLET ORAL at 04:00

## 2017-12-06 RX ADMIN — MORPHINE SULFATE 2 MG: 2 INJECTION, SOLUTION INTRAMUSCULAR; INTRAVENOUS at 21:21

## 2017-12-06 RX ADMIN — LOSARTAN POTASSIUM 100 MG: 50 TABLET, FILM COATED ORAL at 09:06

## 2017-12-06 RX ADMIN — VANCOMYCIN HYDROCHLORIDE 1500 MG: 10 INJECTION, POWDER, LYOPHILIZED, FOR SOLUTION INTRAVENOUS at 09:05

## 2017-12-06 RX ADMIN — OXYCODONE HYDROCHLORIDE AND ACETAMINOPHEN 2 TABLET: 5; 325 TABLET ORAL at 10:56

## 2017-12-06 RX ADMIN — MORPHINE SULFATE 2 MG: 2 INJECTION, SOLUTION INTRAMUSCULAR; INTRAVENOUS at 15:46

## 2017-12-06 RX ADMIN — POTASSIUM CHLORIDE 20 MEQ: 20 TABLET, EXTENDED RELEASE ORAL at 21:13

## 2017-12-06 RX ADMIN — Medication 10 ML: at 21:13

## 2017-12-06 RX ADMIN — MORPHINE SULFATE 2 MG: 2 INJECTION, SOLUTION INTRAMUSCULAR; INTRAVENOUS at 09:06

## 2017-12-06 RX ADMIN — INSULIN GLARGINE 70 UNITS: 100 INJECTION, SOLUTION SUBCUTANEOUS at 21:21

## 2017-12-06 RX ADMIN — INSULIN LISPRO 20 UNITS: 100 INJECTION, SOLUTION INTRAVENOUS; SUBCUTANEOUS at 16:21

## 2017-12-06 RX ADMIN — VANCOMYCIN HYDROCHLORIDE 1500 MG: 10 INJECTION, POWDER, LYOPHILIZED, FOR SOLUTION INTRAVENOUS at 00:22

## 2017-12-06 RX ADMIN — ATENOLOL 50 MG: 50 TABLET ORAL at 09:06

## 2017-12-06 RX ADMIN — MORPHINE SULFATE 2 MG: 2 INJECTION, SOLUTION INTRAMUSCULAR; INTRAVENOUS at 00:22

## 2017-12-06 RX ADMIN — INSULIN LISPRO 10 UNITS: 100 INJECTION, SOLUTION INTRAVENOUS; SUBCUTANEOUS at 09:08

## 2017-12-06 RX ADMIN — MEROPENEM 1 G: 1 INJECTION, POWDER, FOR SOLUTION INTRAVENOUS at 15:50

## 2017-12-06 RX ADMIN — FUROSEMIDE 40 MG: 40 TABLET ORAL at 21:13

## 2017-12-06 RX ADMIN — INSULIN LISPRO 12 UNITS: 100 INJECTION, SOLUTION INTRAVENOUS; SUBCUTANEOUS at 16:23

## 2017-12-06 RX ADMIN — MEROPENEM 1 G: 1 INJECTION, POWDER, FOR SOLUTION INTRAVENOUS at 06:39

## 2017-12-06 RX ADMIN — ENOXAPARIN SODIUM 40 MG: 40 INJECTION SUBCUTANEOUS at 09:06

## 2017-12-06 RX ADMIN — Medication 10 ML: at 09:07

## 2017-12-06 RX ADMIN — INSULIN LISPRO 4 UNITS: 100 INJECTION, SOLUTION INTRAVENOUS; SUBCUTANEOUS at 21:22

## 2017-12-06 RX ADMIN — SULFAMETHOXAZOLE AND TRIMETHOPRIM 1 TABLET: 800; 160 TABLET ORAL at 21:20

## 2017-12-06 RX ADMIN — OXYCODONE HYDROCHLORIDE AND ACETAMINOPHEN 2 TABLET: 5; 325 TABLET ORAL at 14:41

## 2017-12-06 RX ADMIN — INSULIN GLARGINE 55 UNITS: 100 INJECTION, SOLUTION SUBCUTANEOUS at 09:08

## 2017-12-06 ASSESSMENT — PAIN DESCRIPTION - DESCRIPTORS
DESCRIPTORS: ACHING;TENDER
DESCRIPTORS: ACHING;SORE
DESCRIPTORS: ACHING;TENDER;SORE
DESCRIPTORS: ACHING;TENDER
DESCRIPTORS: ACHING;TENDER

## 2017-12-06 ASSESSMENT — PAIN SCALES - GENERAL
PAINLEVEL_OUTOF10: 8
PAINLEVEL_OUTOF10: 6
PAINLEVEL_OUTOF10: 7
PAINLEVEL_OUTOF10: 10
PAINLEVEL_OUTOF10: 10
PAINLEVEL_OUTOF10: 9
PAINLEVEL_OUTOF10: 10
PAINLEVEL_OUTOF10: 7
PAINLEVEL_OUTOF10: 7
PAINLEVEL_OUTOF10: 10
PAINLEVEL_OUTOF10: 7
PAINLEVEL_OUTOF10: 10
PAINLEVEL_OUTOF10: 10
PAINLEVEL_OUTOF10: 9

## 2017-12-06 ASSESSMENT — ENCOUNTER SYMPTOMS
VOICE CHANGE: 0
CONSTIPATION: 0
SHORTNESS OF BREATH: 0
COUGH: 0
SINUS PRESSURE: 0
VOMITING: 0
NAUSEA: 0
BLOOD IN STOOL: 0
DIARRHEA: 0
WHEEZING: 0
ABDOMINAL PAIN: 0
SORE THROAT: 0

## 2017-12-06 ASSESSMENT — PAIN DESCRIPTION - PAIN TYPE
TYPE: ACUTE PAIN

## 2017-12-06 ASSESSMENT — PAIN DESCRIPTION - ONSET
ONSET: ON-GOING

## 2017-12-06 ASSESSMENT — PAIN DESCRIPTION - LOCATION
LOCATION: SCROTUM

## 2017-12-06 ASSESSMENT — PAIN DESCRIPTION - PROGRESSION
CLINICAL_PROGRESSION: NOT CHANGED

## 2017-12-06 ASSESSMENT — PAIN DESCRIPTION - FREQUENCY
FREQUENCY: CONTINUOUS

## 2017-12-06 NOTE — PROGRESS NOTES
Nutrition Assessment    Type and Reason for Visit: Initial, Consult, Patient Education (Uncontrolled DM)    Nutrition Recommendations:    1. Suggest liberalizing diet to 5 carbohydrate choices per meal (to better meet pt's estimated needs) once blood glucose better controlled. 2. Recommend consult to diabetes education for additional follow up - discussed with RN. Malnutrition Assessment:  · Malnutrition Status: No malnutrition  · Findings of the 6 clinical characteristics of malnutrition (Minimum of 2 out of 6 clinical characteristics is required to make the diagnosis of moderate or severe Protein Calorie Malnutrition based on AND/ASPEN Guidelines):  1. Energy Intake-Greater than 75%,      2. Weight Loss-Unable to assess,    3. Fat Loss-No significant subcutaneous fat loss, Orbital, Triceps  4. Muscle Loss-No significant muscle mass loss, Temples (temporalis muscle), Clavicles (pectoralis and deltoids)  5. Fluid Accumulation-Mild fluid accumulation, Extremities  6.  Strength-Not measured    Nutrition Diagnosis:   · Problem: Altered nutrition-related lab values  · Etiology: related to Endocrine dysfunction     Signs and symptoms:  as evidenced by Lab values (Elevated blood glucose)    Nutrition Assessment:  · Subjective Assessment: Pt reports his blood sugars have been running high PTA - states this is d/t switching from insulin pump to Lantus/Humalog. Pt states he is familiar with diabetic diet and continues to count carbohydrates. Breakfast and lunch typically contains processed or fast foods while dinner is often home cooked. Pt reports being compliant with diet during admission (not eating additional foods or foods brought in).    · Wound Type: None (Abscess and redness noted)  · Current Nutrition Therapies:  · Oral Diet Orders: Carb Control 4 Carbs/Meal   · Oral Diet intake: %  · Oral Nutrition Supplement (ONS) Orders: None  · Anthropometric Measures:  · Ht: 6' 1\" (185.4 cm)   · Current Body

## 2017-12-06 NOTE — FLOWSHEET NOTE
When  entered the room pt was sitting up and had just finished breakfast. Pt said that his wife has been supportive and \"she's really the only family I have. \" Pt said he is feeling better.  wished pt well. Chaplains will remain available to offer spiritual and emotional support as needed.

## 2017-12-06 NOTE — PROGRESS NOTES
Daily Progress Note     Admit Date: 12/4/2017  Bed/Room No.  0324/0324-01  Admitting Physician : Gregory Schneider MD  Code Status :Full Code  Hospital Day:  LOS: 2 days   Complaint at Admission : Uncontrolled type 2 diabetes mellitus with hyperglycemia, scrotal abscess. Principal Problem:    Abscess, scrotum  Active Problems:    Severe uncontrolled diabetes mellitus ([address]    Morbid obesity with BMI of 70 and over, adult ([facility]    Simple chronic bronchitis (HCC)    Subjective: Interval History/Significant events :  12/06/17    Patient Reports improvement in swelling and pain scrotum. Blood sugar continues to be high. Denies any fever, chills. No nausea, vomiting. Patient reports abdominal, epigastric discomfort after Emily Brenad was given yesterday. Vitals - Stable afebrile  Labs - hyperglycemia, hyponatremia,     Nursing notes , Consults notes reviewed. Overnight events and updates discussed with Nursing staff . Background history    Admitted for Abscess, scrotum , in hospital for 2 days . Ok Fass 36 y.o. male  is admitted to the hospital for the management of Scrotal abscess, hyperglycemia and uncontrolled type 2 diabetes mellitus, morbid obesity. Patient was transferred from [facility] emergency room where he presented with scrotal wound. He has been having swelling in his scrotum for last few weeks. Pain was constant and worse with sitting. Patient denied any fever, chills. He denied any discharge. He does not have any dysuria, frequency, hematuria. Patient is morbidly obese and has underlying type 2 diabetes mellitus which is uncontrolled. Patient's initial evaluation over [facility] showed blood sugar more than 450 without ketoacidosis. He was found to have scrotal abscess. Due to his weight patient was transferred to [facility] for urological evaluation. Patient underwent I&D on 12/5/17.    PMH:  Past Medical History:   Diagnosis Date    Arthritis     of the knees    Asthma and no guarding. Genitourinary:   Genitourinary Comments: Olson catheter in place. Scrotal turban dressing in place. Lymphadenopathy:     He has no cervical adenopathy. Neurological: He is alert and oriented to person, place, and time. No cranial nerve deficit. He exhibits normal muscle tone. Skin: Skin is warm. No rash noted. He is not diaphoretic. Psychiatric: He has a normal mood and affect. His behavior is normal.   Nursing note and vitals reviewed. Lower Extremities : No ankle Edema , No calf Tenderness     Laboratory findings:    Recent Labs      12/05/17   0555   WBC  14.5*   HGB  12.0*   HCT  37.4*   PLT  278   INR  1.1     Recent Labs      12/04/17 2258  12/05/17   0555   NA  129*  132*   K  4.3  4.2   CL  90*  94*   CO2  24  23   GLUCOSE  336*  347*   BUN  27*  28*   CREATININE  0.99  0.98   MG   --   1.6   CALCIUM  8.1*  8.0*   CAION   --   0.98*     Recent Labs      12/04/17 2258 12/04/17   2321  12/05/17   0555   PROT  7.4   --   7.3   LABALBU  3.4*   --   3.1*   LABA1C   --   12.3*   --    AST  15   --   14   ALT  20   --   18   ALKPHOS  67   --   65   BILITOT  0.33   --   0.51        Lab Results   Component Value Date    LABA1C 12.3 (H) 12/04/2017         No results found for: [place], RBCUA, BLOODU, BACTERIA, NITRU, WBCUA, LEUKOCYTESUR    Imaging/Diagonstics:           Clinical Course : gradually improving  Assessment and Plan      1. Scrotal abscess-continue imipenem, Vanco. S/p  I&D. Follow culture sensitivity. 2. Uncontrolled type 2 diabetes mellitus with hyperglycemia without diabetic ketoacidosis- increase Lantus 70 units twice a day- humulin lispro 20 units premeal . high Insulin lispro sliding scale. A1c 12.3   3. COPD - albuterol as needed  4. Current smoker-nicotine patch  5. Morbid history BMI 70 - needs to loose weight .    6. Obstructive sleep apnea-CPAP at night          Continue to monitor vitals , Intake / output ,  Cell count , HGB , Kidney function,

## 2017-12-06 NOTE — ANESTHESIA POSTPROCEDURE EVALUATION
Department of Anesthesiology  Postprocedure Note    Patient: Britta Lo  MRN: [id number]  YOB: 1975  Date of evaluation: 12/5/2017  Time:  8:29 PM     Procedure Summary     Date:  12/05/17 Room / Location:  [facility] OR  / [facility] OR    Anesthesia Start:  8220 Anesthesia Stop:  0367    Procedure:  SCROTAL INCISION AND DRAINAGE, DIFFICULT PRAJAPATI INSERTION (N/A Scrotum) Diagnosis:  (add on )    Surgeon:  Blaire Barreto MD Responsible Provider:  Viry Glez MD    Anesthesia Type:  MAC ASA Status:  3          Anesthesia Type: MAC    Elisha Phase I: Elisha Score: 9    Elisha Phase II:      Last vitals: Reviewed and per EMR flowsheets.        Anesthesia Post Evaluation    Patient location during evaluation: PACU  Patient participation: complete - patient participated  Level of consciousness: awake and alert  Airway patency: patent  Nausea & Vomiting: no nausea and no vomiting  Complications: no  Cardiovascular status: hemodynamically stable  Respiratory status: nasal cannula and acceptable  Hydration status: euvolemic

## 2017-12-06 NOTE — PROGRESS NOTES
Physical Therapy  DATE: 2017    NAME: Mir Vieyra  MRN: [id number]   : 1975    Patient not seen this date for Physical Therapy due to:  [] Blood transfusion in progress  [] Hemodialysis  []  Patient Declined  [] Spine Precautions   [x] Strict Bedrest- RN notified of activity order and she sent physician a perfect serve message. Awaiting update in activity order- check back 17  [] Surgery/ Procedure  [] Testing      [] Other        [] PT being discontinued at this time. Patient independent. No further needs. [] PT being discontinued at this time as the patient has been transferred to palliative care. No further needs.     Genesis Moe, PT

## 2017-12-06 NOTE — PLAN OF CARE
Problem: Pain:  Goal: Pain level will decrease  Pain level will decrease   Outcome: Ongoing  The patient reports continuous pain at a 6 or greater but we are managing it with both percocet and morphine. The patients vitals remain WNL.

## 2017-12-06 NOTE — PROGRESS NOTES
[facility]  Urology Progress Note    Subjective: No acute events overnight. POD#1 s/p I&D of scrotum, difficult sewell catheter. Catheter draining well. Pain in area controlled. No n/v/f/c/cp/sob. No other issues. Patient Vitals for the past 24 hrs:   BP Temp Temp src Pulse Resp SpO2 Height Weight   12/06/17 0432 104/67 98.7 °F (37.1 °C) Oral 80 19 99 % - -   12/06/17 0334 - - - - 17 - - -   12/06/17 0253 - - - 83 - - - -   12/06/17 0036 132/75 99 °F (37.2 °C) Oral 125 19 98 % - -   12/05/17 2345 - - - - 18 - - -   12/05/17 2212 - - - - 21 - - -   12/05/17 2210 - - - 120 - - - -   12/05/17 2050 (!) 112/58 98.9 °F (37.2 °C) Oral 128 22 93 % - -   12/05/17 1653 (!) 121/59 98.3 °F (36.8 °C) - 111 20 96 % - -   12/05/17 1630 (!) 105/55 - - 128 (!) 45 95 % - -   12/05/17 1615 - 97.9 °F (36.6 °C) - - - - - -   12/05/17 1600 (!) 106/50 - - 102 12 95 % - -   12/05/17 1545 (!) 109/44 - - 117 12 97 % - -   12/05/17 1530 (!) 119/48 - - 125 12 97 % - -   12/05/17 1515 (!) 121/56 - - 126 15 98 % - -   12/05/17 1459 134/68 98.1 °F (36.7 °C) Temporal 128 16 98 % - -   12/05/17 1255 117/71 98.8 °F (37.1 °C) Temporal 126 24 97 % 6' 1\" (1.854 m) (!) 575 lb (260.8 kg)   12/05/17 0800 134/69 98.6 °F (37 °C) - 122 20 97 % - -       Intake/Output Summary (Last 24 hours) at 12/06/17 6819  Last data filed at 12/06/17 7881   Gross per 24 hour   Intake             1650 ml   Output             1900 ml   Net             -250 ml       Recent Labs      12/05/17   0555   WBC  14.5*   HGB  12.0*   HCT  37.4*   MCV  92.6   PLT  278     Recent Labs      12/04/17   2258  12/05/17   0555   NA  129*  132*   K  4.3  4.2   CL  90*  94*   CO2  24  23   BUN  27*  28*   CREATININE  0.99  0.98       No results for input(s): COLORU, PHUR, LABCAST, WBCUA, RBCUA, MUCUS, TRICHOMONAS, YEAST, BACTERIA, CLARITYU, SPECGRAV, LEUKOCYTESUR, UROBILINOGEN, BILIRUBINUR, BLOODU in the last 72 hours.     Invalid input(s): NITRATE,

## 2017-12-07 LAB
ABSOLUTE EOS #: 1.67 K/UL (ref 0–0.44)
ABSOLUTE IMMATURE GRANULOCYTE: 0.18 K/UL (ref 0–0.3)
ABSOLUTE LYMPH #: 1.52 K/UL (ref 1.1–3.7)
ABSOLUTE MONO #: 1.13 K/UL (ref 0.1–1.2)
ALBUMIN SERPL-MCNC: 3.2 G/DL (ref 3.5–5.2)
ALBUMIN/GLOBULIN RATIO: 0.8 (ref 1–2.5)
ALP BLD-CCNC: 63 U/L (ref 40–129)
ALT SERPL-CCNC: 17 U/L (ref 5–41)
ANION GAP SERPL CALCULATED.3IONS-SCNC: 12 MMOL/L (ref 9–17)
AST SERPL-CCNC: 13 U/L
BASOPHILS # BLD: 1 % (ref 0–2)
BASOPHILS ABSOLUTE: 0.08 K/UL (ref 0–0.2)
BILIRUB SERPL-MCNC: 0.35 MG/DL (ref 0.3–1.2)
BUN BLDV-MCNC: 30 MG/DL (ref 6–20)
BUN/CREAT BLD: ABNORMAL (ref 9–20)
CALCIUM IONIZED: 1.15 MMOL/L (ref 1.13–1.33)
CALCIUM SERPL-MCNC: 8.3 MG/DL (ref 8.6–10.4)
CHLORIDE BLD-SCNC: 94 MMOL/L (ref 98–107)
CO2: 27 MMOL/L (ref 20–31)
CREAT SERPL-MCNC: 0.96 MG/DL (ref 0.7–1.2)
DIFFERENTIAL TYPE: ABNORMAL
EKG ATRIAL RATE: 86 BPM
EKG P AXIS: -92 DEGREES
EKG P-R INTERVAL: 192 MS
EKG Q-T INTERVAL: 398 MS
EKG QRS DURATION: 134 MS
EKG QTC CALCULATION (BAZETT): 476 MS
EKG R AXIS: -67 DEGREES
EKG T AXIS: 39 DEGREES
EKG VENTRICULAR RATE: 86 BPM
EOSINOPHILS RELATIVE PERCENT: 14 % (ref 1–4)
GFR AFRICAN AMERICAN: >60 ML/MIN
GFR NON-AFRICAN AMERICAN: >60 ML/MIN
GFR SERPL CREATININE-BSD FRML MDRD: ABNORMAL ML/MIN/{1.73_M2}
GFR SERPL CREATININE-BSD FRML MDRD: ABNORMAL ML/MIN/{1.73_M2}
GLUCOSE BLD-MCNC: 322 MG/DL (ref 70–99)
GLUCOSE BLD-MCNC: 323 MG/DL (ref 75–110)
GLUCOSE BLD-MCNC: 326 MG/DL (ref 75–110)
GLUCOSE BLD-MCNC: 333 MG/DL (ref 75–110)
GLUCOSE BLD-MCNC: 346 MG/DL (ref 75–110)
GLUCOSE BLD-MCNC: 356 MG/DL (ref 75–110)
HCT VFR BLD CALC: 37.1 % (ref 40.7–50.3)
HEMOGLOBIN: 11.7 G/DL (ref 13–17)
IMMATURE GRANULOCYTES: 2 %
LACTIC ACID, WHOLE BLOOD: 1.3 MMOL/L (ref 0.7–2.1)
LACTIC ACID, WHOLE BLOOD: 1.4 MMOL/L (ref 0.7–2.1)
LACTIC ACID: NORMAL MMOL/L
LYMPHOCYTES # BLD: 13 % (ref 24–43)
MAGNESIUM: 1.9 MG/DL (ref 1.6–2.6)
MCH RBC QN AUTO: 29 PG (ref 25.2–33.5)
MCHC RBC AUTO-ENTMCNC: 31.5 G/DL (ref 28.4–34.8)
MCV RBC AUTO: 91.8 FL (ref 82.6–102.9)
MONOCYTES # BLD: 10 % (ref 3–12)
PDW BLD-RTO: 12.4 % (ref 11.8–14.4)
PLATELET # BLD: 311 K/UL (ref 138–453)
PLATELET ESTIMATE: ABNORMAL
PMV BLD AUTO: 9.6 FL (ref 8.1–13.5)
POTASSIUM SERPL-SCNC: 4.2 MMOL/L (ref 3.7–5.3)
RBC # BLD: 4.04 M/UL (ref 4.21–5.77)
RBC # BLD: ABNORMAL 10*6/UL
SEG NEUTROPHILS: 60 % (ref 36–65)
SEGMENTED NEUTROPHILS ABSOLUTE COUNT: 7.05 K/UL (ref 1.5–8.1)
SODIUM BLD-SCNC: 133 MMOL/L (ref 135–144)
TOTAL PROTEIN: 7.4 G/DL (ref 6.4–8.3)
WBC # BLD: 11.6 K/UL (ref 3.5–11.3)
WBC # BLD: ABNORMAL 10*3/UL

## 2017-12-07 PROCEDURE — 6370000000 HC RX 637 (ALT 250 FOR IP): Performed by: FAMILY MEDICINE

## 2017-12-07 PROCEDURE — 2060000000 HC ICU INTERMEDIATE R&B

## 2017-12-07 PROCEDURE — 6370000000 HC RX 637 (ALT 250 FOR IP): Performed by: INTERNAL MEDICINE

## 2017-12-07 PROCEDURE — 6360000002 HC RX W HCPCS: Performed by: FAMILY MEDICINE

## 2017-12-07 PROCEDURE — 82947 ASSAY GLUCOSE BLOOD QUANT: CPT

## 2017-12-07 PROCEDURE — 93005 ELECTROCARDIOGRAM TRACING: CPT

## 2017-12-07 PROCEDURE — 82330 ASSAY OF CALCIUM: CPT

## 2017-12-07 PROCEDURE — 6370000000 HC RX 637 (ALT 250 FOR IP): Performed by: NURSE PRACTITIONER

## 2017-12-07 PROCEDURE — 99232 SBSQ HOSP IP/OBS MODERATE 35: CPT | Performed by: FAMILY MEDICINE

## 2017-12-07 PROCEDURE — 99211 OFF/OP EST MAY X REQ PHY/QHP: CPT

## 2017-12-07 PROCEDURE — 94660 CPAP INITIATION&MGMT: CPT

## 2017-12-07 PROCEDURE — 85025 COMPLETE CBC W/AUTO DIFF WBC: CPT

## 2017-12-07 PROCEDURE — 94762 N-INVAS EAR/PLS OXIMTRY CONT: CPT

## 2017-12-07 PROCEDURE — 83605 ASSAY OF LACTIC ACID: CPT

## 2017-12-07 PROCEDURE — 80053 COMPREHEN METABOLIC PANEL: CPT

## 2017-12-07 PROCEDURE — 83735 ASSAY OF MAGNESIUM: CPT

## 2017-12-07 PROCEDURE — 6360000002 HC RX W HCPCS: Performed by: STUDENT IN AN ORGANIZED HEALTH CARE EDUCATION/TRAINING PROGRAM

## 2017-12-07 PROCEDURE — 2580000003 HC RX 258: Performed by: FAMILY MEDICINE

## 2017-12-07 PROCEDURE — 36415 COLL VENOUS BLD VENIPUNCTURE: CPT

## 2017-12-07 RX ORDER — ASPIRIN 81 MG/1
81 TABLET ORAL DAILY
Status: DISCONTINUED | OUTPATIENT
Start: 2017-12-07 | End: 2017-12-13 | Stop reason: HOSPADM

## 2017-12-07 RX ORDER — INSULIN GLARGINE 100 [IU]/ML
75 INJECTION, SOLUTION SUBCUTANEOUS 2 TIMES DAILY
Status: DISCONTINUED | OUTPATIENT
Start: 2017-12-07 | End: 2017-12-13 | Stop reason: HOSPADM

## 2017-12-07 RX ORDER — METOPROLOL TARTRATE 50 MG/1
50 TABLET, FILM COATED ORAL 2 TIMES DAILY
Status: DISCONTINUED | OUTPATIENT
Start: 2017-12-07 | End: 2017-12-13 | Stop reason: HOSPADM

## 2017-12-07 RX ADMIN — POTASSIUM CHLORIDE 20 MEQ: 20 TABLET, EXTENDED RELEASE ORAL at 08:11

## 2017-12-07 RX ADMIN — APIXABAN 5 MG: 5 TABLET, FILM COATED ORAL at 20:39

## 2017-12-07 RX ADMIN — ANTACID TABLETS 500 MG: 500 TABLET, CHEWABLE ORAL at 20:39

## 2017-12-07 RX ADMIN — INSULIN LISPRO 8 UNITS: 100 INJECTION, SOLUTION INTRAVENOUS; SUBCUTANEOUS at 18:05

## 2017-12-07 RX ADMIN — INSULIN LISPRO 20 UNITS: 100 INJECTION, SOLUTION INTRAVENOUS; SUBCUTANEOUS at 08:40

## 2017-12-07 RX ADMIN — INSULIN GLARGINE 75 UNITS: 100 INJECTION, SOLUTION SUBCUTANEOUS at 21:28

## 2017-12-07 RX ADMIN — MORPHINE SULFATE 2 MG: 2 INJECTION, SOLUTION INTRAMUSCULAR; INTRAVENOUS at 14:53

## 2017-12-07 RX ADMIN — MAGNESIUM SULFATE HEPTAHYDRATE 2 G: 1 INJECTION, SOLUTION INTRAVENOUS at 00:16

## 2017-12-07 RX ADMIN — ASPIRIN 81 MG: 81 TABLET, COATED ORAL at 12:23

## 2017-12-07 RX ADMIN — OXYCODONE HYDROCHLORIDE AND ACETAMINOPHEN 2 TABLET: 5; 325 TABLET ORAL at 22:34

## 2017-12-07 RX ADMIN — INSULIN LISPRO 20 UNITS: 100 INJECTION, SOLUTION INTRAVENOUS; SUBCUTANEOUS at 12:21

## 2017-12-07 RX ADMIN — MORPHINE SULFATE 2 MG: 2 INJECTION, SOLUTION INTRAMUSCULAR; INTRAVENOUS at 20:39

## 2017-12-07 RX ADMIN — MAGNESIUM SULFATE HEPTAHYDRATE 1 G: 1 INJECTION, SOLUTION INTRAVENOUS at 02:57

## 2017-12-07 RX ADMIN — POTASSIUM CHLORIDE 20 MEQ: 20 TABLET, EXTENDED RELEASE ORAL at 20:39

## 2017-12-07 RX ADMIN — ATENOLOL 50 MG: 50 TABLET ORAL at 08:10

## 2017-12-07 RX ADMIN — FUROSEMIDE 40 MG: 40 TABLET ORAL at 21:27

## 2017-12-07 RX ADMIN — MORPHINE SULFATE 2 MG: 2 INJECTION, SOLUTION INTRAMUSCULAR; INTRAVENOUS at 22:56

## 2017-12-07 RX ADMIN — OXYCODONE HYDROCHLORIDE AND ACETAMINOPHEN 2 TABLET: 5; 325 TABLET ORAL at 17:47

## 2017-12-07 RX ADMIN — MORPHINE SULFATE 2 MG: 2 INJECTION, SOLUTION INTRAMUSCULAR; INTRAVENOUS at 18:36

## 2017-12-07 RX ADMIN — Medication 10 ML: at 08:11

## 2017-12-07 RX ADMIN — METOPROLOL TARTRATE 50 MG: 50 TABLET, FILM COATED ORAL at 12:23

## 2017-12-07 RX ADMIN — MORPHINE SULFATE 2 MG: 2 INJECTION, SOLUTION INTRAMUSCULAR; INTRAVENOUS at 06:00

## 2017-12-07 RX ADMIN — OXYCODONE HYDROCHLORIDE AND ACETAMINOPHEN 2 TABLET: 5; 325 TABLET ORAL at 08:10

## 2017-12-07 RX ADMIN — INSULIN GLARGINE 70 UNITS: 100 INJECTION, SOLUTION SUBCUTANEOUS at 11:51

## 2017-12-07 RX ADMIN — SULFAMETHOXAZOLE AND TRIMETHOPRIM 1 TABLET: 800; 160 TABLET ORAL at 21:27

## 2017-12-07 RX ADMIN — DILTIAZEM HYDROCHLORIDE 240 MG: 240 CAPSULE, COATED, EXTENDED RELEASE ORAL at 08:10

## 2017-12-07 RX ADMIN — MORPHINE SULFATE 2 MG: 2 INJECTION, SOLUTION INTRAMUSCULAR; INTRAVENOUS at 10:14

## 2017-12-07 RX ADMIN — METOPROLOL TARTRATE 50 MG: 50 TABLET, FILM COATED ORAL at 20:38

## 2017-12-07 RX ADMIN — OXYCODONE HYDROCHLORIDE AND ACETAMINOPHEN 2 TABLET: 5; 325 TABLET ORAL at 00:16

## 2017-12-07 RX ADMIN — OXYCODONE HYDROCHLORIDE AND ACETAMINOPHEN 2 TABLET: 5; 325 TABLET ORAL at 12:51

## 2017-12-07 RX ADMIN — LOSARTAN POTASSIUM 100 MG: 50 TABLET, FILM COATED ORAL at 08:10

## 2017-12-07 RX ADMIN — METOLAZONE 2.5 MG: 2.5 TABLET ORAL at 08:10

## 2017-12-07 RX ADMIN — MORPHINE SULFATE 2 MG: 2 INJECTION, SOLUTION INTRAMUSCULAR; INTRAVENOUS at 02:19

## 2017-12-07 RX ADMIN — INSULIN LISPRO 8 UNITS: 100 INJECTION, SOLUTION INTRAVENOUS; SUBCUTANEOUS at 11:51

## 2017-12-07 RX ADMIN — FUROSEMIDE 40 MG: 40 TABLET ORAL at 08:10

## 2017-12-07 RX ADMIN — INSULIN LISPRO 8 UNITS: 100 INJECTION, SOLUTION INTRAVENOUS; SUBCUTANEOUS at 08:12

## 2017-12-07 RX ADMIN — Medication 10 ML: at 21:30

## 2017-12-07 RX ADMIN — ANTACID TABLETS 500 MG: 500 TABLET, CHEWABLE ORAL at 08:09

## 2017-12-07 RX ADMIN — SULFAMETHOXAZOLE AND TRIMETHOPRIM 1 TABLET: 800; 160 TABLET ORAL at 08:11

## 2017-12-07 RX ADMIN — INSULIN LISPRO 5 UNITS: 100 INJECTION, SOLUTION INTRAVENOUS; SUBCUTANEOUS at 21:29

## 2017-12-07 RX ADMIN — ENOXAPARIN SODIUM 40 MG: 40 INJECTION SUBCUTANEOUS at 08:10

## 2017-12-07 RX ADMIN — INSULIN LISPRO 20 UNITS: 100 INJECTION, SOLUTION INTRAVENOUS; SUBCUTANEOUS at 18:03

## 2017-12-07 ASSESSMENT — PAIN SCALES - GENERAL
PAINLEVEL_OUTOF10: 8
PAINLEVEL_OUTOF10: 9
PAINLEVEL_OUTOF10: 8
PAINLEVEL_OUTOF10: 8
PAINLEVEL_OUTOF10: 9
PAINLEVEL_OUTOF10: 8
PAINLEVEL_OUTOF10: 7
PAINLEVEL_OUTOF10: 8
PAINLEVEL_OUTOF10: 9
PAINLEVEL_OUTOF10: 8
PAINLEVEL_OUTOF10: 8

## 2017-12-07 ASSESSMENT — ENCOUNTER SYMPTOMS
ABDOMINAL PAIN: 0
SORE THROAT: 0
CONSTIPATION: 0
DIARRHEA: 0
COUGH: 0
VOMITING: 0
SINUS PRESSURE: 0
BLOOD IN STOOL: 0
VOICE CHANGE: 0
WHEEZING: 0
NAUSEA: 0
SHORTNESS OF BREATH: 0

## 2017-12-07 ASSESSMENT — PAIN DESCRIPTION - PROGRESSION
CLINICAL_PROGRESSION: NOT CHANGED

## 2017-12-07 ASSESSMENT — PAIN DESCRIPTION - FREQUENCY
FREQUENCY: CONTINUOUS

## 2017-12-07 ASSESSMENT — PAIN DESCRIPTION - ONSET
ONSET: ON-GOING

## 2017-12-07 ASSESSMENT — PAIN DESCRIPTION - DESCRIPTORS
DESCRIPTORS: SORE;TENDER
DESCRIPTORS: SORE;TENDER

## 2017-12-07 ASSESSMENT — PAIN DESCRIPTION - PAIN TYPE
TYPE: ACUTE PAIN
TYPE: ACUTE PAIN;SURGICAL PAIN
TYPE: ACUTE PAIN;CHRONIC PAIN

## 2017-12-07 ASSESSMENT — PAIN DESCRIPTION - LOCATION
LOCATION: OTHER (COMMENT)
LOCATION: SCROTUM

## 2017-12-07 ASSESSMENT — PAIN DESCRIPTION - ORIENTATION: ORIENTATION: RIGHT

## 2017-12-07 NOTE — PROGRESS NOTES
[facility]  Occupational Therapy Not Seen Note    Patient not available for Occupational Therapy due to:    [] Testing:    [] Hemodialysis    [] Blood Transfusion in Progress    [x]Refusal by Patient: Pt reports too much pain to move at this time. Pt agreeable to re-attempt this PM    [] Surgery/Procedure:    [x] Strict Bedrest    [] Sedation    [] Spine Precautions     [] Pt being transferred to palliative care at this time. Spoke with pt/family and OT services to be defered. [] Pt independent with functional mobility and functional tasks.  Pt with no OT acute care needs at this time, will defer OT eval.    [x] Other: CRISTI palacios MD to clarify strict BR orders (orders state strict BR, advance of activity as tolerated with assistance as medical status/ stability allows)    Next Scheduled Treatment: Re-check 12/7 as able or 12/8/2017    Signature:

## 2017-12-07 NOTE — PROGRESS NOTES
Redness           Objective:      /60   Pulse 100   Temp 97.9 °F (36.6 °C) (Oral)   Resp 15   Ht 6' 1\" (1.854 m)   Wt (!) 578 lb 3.2 oz (262.3 kg)   SpO2 93%   BMI 76.28 kg/m²   Prakash Risk Score: Prakash Scale Score: 17    LABS    CBC:   Lab Results   Component Value Date    WBC 11.6 12/07/2017    RBC 4.04 12/07/2017    HGB 11.7 12/07/2017     CMP:  Albumin:    Lab Results   Component Value Date    LABALBU 3.2 12/07/2017     PT/INR:    Lab Results   Component Value Date    PROTIME 11.8 12/05/2017    INR 1.1 12/05/2017     HgBA1c:    Lab Results   Component Value Date    LABA1C 12.3 12/04/2017     PTT: No components found for: LABPTT      Assessment:     Patient Active Problem List   Diagnosis    Abscess, scrotum    Severe uncontrolled diabetes mellitus ([address]    Morbid obesity with BMI of 70 and over, adult ([address]    Simple chronic bronchitis ([facility]       Measurements:  Incision 12/05/17 Scrotum (Active)   Wound Assessment WALT 12/7/2017  4:59 AM   Talisha-wound Assessment WALT 12/7/2017  4:59 AM   Closure WALT 12/7/2017  4:59 AM   Drainage Amount Small 12/7/2017  4:59 AM   Drainage Description Serosanguinous 12/7/2017  4:59 AM   Odor None 12/7/2017  4:59 AM   Dressing/Treatment Packing 12/5/2017  4:15 PM   Dressing Status Dry; Intact; Old drainage 12/7/2017  4:59 AM   Dressing Change Due 12/07/17 12/7/2017  4:59 AM   Number of days: 1     Patient declines removal of packing at this time as this was completed by the surgeon early this morning. Patient states he cannot complete dressings per self  He states his spouse cannot complete dressings  He reports he has multiple dogs at home and he has concern for the safety of VNS. patient has concerns regarding toilet in his hospital room cannot accommodate his weight. Commode in room is also too small. Did discuss the need to provide the patient with equipment or move him to a bariatric room with the primary RN and charge RN.            Plan:     Plan of Care: Incision 12/05/17 Scrotum-Dressing/Treatment: Packing   NS moistened gauze packing daily to the scrotal abscess daily  Elevate the scrotum  Encourage patient to sit in chair TID when appropriate sized chair available. Patient did roll self and reposition self up in bed independently. He is aware he needs to move at least every 2 hours fully changing his position to decrease his risk of pressure injury. Writer will assess scrotal wound in AM.      Specialty Bed Required : Yes   [x] Low Air Loss   [x] Pressure Redistribution  [] Fluid Immersion  [x] Bariatric  [] Total Pressure Relief  [x] Other: bariatric commode, chair. Discharge Plan:  Placement for patient upon discharge: skilled nursing   Hospice Care: No   Patient appropriate for Outpatient [address]: Yes: follow up to urologist at discharge. If wound healing is delayed may seek care at a wound care center.  He has used several in the past.    Patient/Caregiver Teaching:     [] Indicates understanding       [] Needs reinforcement  [] Unsuccessful      [x] Verbal Understanding  [] Demonstrated understanding       [] No evidence of learning  [] Refused teaching         [] N/A       Electronically signed by Jaguar Turner RN, CWON on 12/7/2017 at 12:39 PM

## 2017-12-07 NOTE — PROGRESS NOTES
The patient is not willing to get up to the chair. He also will not allow me to turn him in the bed. The patient is quite large, has scrotal pain, and refusing to move for the RN. Bariatric bed in use.

## 2017-12-07 NOTE — PROGRESS NOTES
Physical Therapy  DATE: 2017    NAME: Mary Ellen Calixto  MRN: [id number]   : 1975    Patient not seen this date for Physical Therapy due to:  [] Blood transfusion in progress  [] Hemodialysis  [x]  Patient Declined: Pt reports too much pain to get up/ ambulate d/t edema, but states likely possible this afternoon. Will check pm as able.   [] Spine Precautions   [x] Strict Bedrest: orders in place but with comment stating \"May advance to \"activity as tolerated with assistance\" as medical status/stability allows\"  [] Surgery/ Procedure  [] Testing      [] Other        [] PT being discontinued at this time. Patient independent. No further needs. [] PT being discontinued at this time as the patient has been transferred to palliative care. No further needs.     Natalie Kulkarni, PT

## 2017-12-07 NOTE — PROGRESS NOTES
foul odors, clean base, no drainage. Warm extremities, no calf tenderness      Interval Imaging Findings:    Impression:  Abscess, scrotum  Active Hospital Problems    Diagnosis Date Noted    Abscess, scrotum [N49.2] 12/04/2017     Priority: High    Morbid obesity with BMI of 70 and over, adult ([facility] [E66.01, Z68.45] 12/05/2017    Simple chronic bronchitis ([facility] [J41.0] 12/05/2017    Severe uncontrolled diabetes mellitus ([facility] [E11.65] 12/04/2017       Plan:   Supportive care  Medical management per medicine primary  Will consult wound care for daily wet to dry dressings of wound  Continue bactrim oral for 7 days  Social work to arrange home care for packing vs. SNF/rehab vs. Teaching spouse      Bard Lucero  6:25 AM 12/7/2017      I have discussed the care of this patient including pertinent history and exam findings, with the resident. I have seen and examined the patient and the key elements of all parts of the encounter have been performed by me. I agree with the assessment, plan and orders as documented by the resident.   Imelda Garnica M.D

## 2017-12-07 NOTE — PROGRESS NOTES
NON INVASIVE VENTILATION  PROVIDE OPTIMAL VENTILATION/ACCEPTABLE SP02  IMPLEMENT NON INVASIVE VENTILATION PROTOCOL  ASSESSMENT SKIN INTEGRITY  PATIENT EDUCATION AS NEEDED  BIPAP AS NEEDED

## 2017-12-07 NOTE — PROGRESS NOTES
Contacted Fara Batista NP about pt being in afib when he came to the unit, and now that the pt is in NSR. Will continue to monitor.     Electronically signed by Barry Sheikh RN on 12/7/2017 at 3:59 AM

## 2017-12-07 NOTE — PLAN OF CARE
Problem: Pain:  Goal: Pain level will decrease  Pain level will decrease   Outcome: Ongoing  Offering pain medicine as needed, teaching relaxation techniques, educating on pain scale for communicating pain.

## 2017-12-07 NOTE — CONSULTS
mouth every 8 hours as needed for Pain . Yes Historical Provider, MD   albuterol sulfate  (90 Base) MCG/ACT inhaler Inhale 2 puffs into the lungs every 4 hours as needed for Wheezing   Yes Historical Provider, MD   cetirizine-psuedoephedrine (ZYRTEC-D) 5-120 MG per extended release tablet Take 1 tablet by mouth 2 times daily   Yes Historical Provider, MD   albuterol (PROVENTIL) (2.5 MG/3ML) 0.083% nebulizer solution Take 2.5 mg by nebulization every 4 hours   Yes Historical Provider, MD   losartan (COZAAR) 100 MG tablet Take 100 mg by mouth daily   Yes Historical Provider, MD   metolazone (ZAROXOLYN) 2.5 MG tablet Take 2.5 mg by mouth daily   Yes Historical Provider, MD   potassium chloride (KLOR-CON M) 20 MEQ extended release tablet Take 20 mEq by mouth 2 times daily   Yes Historical Provider, MD   ipratropium-albuterol (DUONEB) 0.5-2.5 (3) MG/3ML SOLN nebulizer solution Inhale 1 vial into the lungs 4 times daily   Yes Historical Provider, MD   apixaban (ELIQUIS) 5 MG TABS tablet Take 5 mg by mouth 2 times daily   Yes Historical Provider, MD   insulin lispro (HUMALOG) 100 UNIT/ML injection vial Inject into the skin 3 times daily (before meals)   Yes Historical Provider, MD   insulin lispro protamine & lispro (HUMALOG MIX) (75-25) 100 UNIT per ML SUSP injection vial Inject into the skin 3 times daily (with meals)   Yes Historical Provider, MD   atenolol (TENORMIN) 50 MG tablet Take 50 mg by mouth daily   Yes Historical Provider, MD   diltiazem (CARDIZEM CD) 240 MG extended release capsule Take 240 mg by mouth daily   Yes Historical Provider, MD       Allergies:  Zosyn [piperacillin sod-tazobactam so]    Social History:   reports that he quit smoking about 5 months ago. His smoking use included Cigarettes. He started smoking about 2 years ago. He smoked 1.00 pack per day. He has never used smokeless tobacco. He reports that he drinks alcohol. He reports that he does not use drugs.      Family History: negative for early CAD    REVIEW OF SYSTEMS:    · Constitutional: there has been no unanticipated weight loss. No change in functional capacity. · Eyes: No visual changes or diplopia. · ENT: No Headaches, hearing loss or vertigo. No mouth sores or sore throat. · Cardiovascular: No chest pain, no dyspnea at rest, Denies orthopnea, palpitations or pre syncope. +ve for a-fib as mentioned above. · Respiratory: No hx of productive cough, pleuritic chest pain   · Gastrointestinal: No abdominal pain, appetite loss, blood in stools. No change in bowel habits. · Genitourinary: +ve scrotal abscess. · Musculoskeletal:  No gait disturbance, No weakness or joint complaints. · Integumentary: No rash or pruritis. · Neurological: No headache, weakness, numbness or tingling. No change in gait, balance, coordination. · Psychiatric: No anxiety, or depression. · Endocrine: No temperature intolerance. No excessive thirst, fluid intake, or urination. No tremor. · Hematologic/Lymphatic: No abnormal bruising or bleeding, blood clots or swollen lymph nodes. · Allergic/Immunologic: No nasal congestion or hives. PHYSICAL EXAM:    Physical Examination:    /60   Pulse 100   Temp 97.9 °F (36.6 °C) (Oral)   Resp 15   Ht 6' 1\" (1.854 m)   Wt (!) 578 lb 3.2 oz (262.3 kg)   SpO2 93%   BMI 76.28 kg/m²    Constitutional and General Appearance: obese, alert, cooperative, in no distress on nasal cannula  HEENT: PERRL, no cervical lymphadenopathy. Normal oral mucosa. Respiratory:  · Normal excursion and expansion without use of accessory muscles  · Resp Auscultation: Good respiratory effort. No for increased work of breathing. On auscultation: reduced air entry at both bases, exam limited on posterior side, no wheezing,  No rales. Cardiovascular:  · The apical impulse is not displaced  · Heart tones are crisp and irregularly irregular HR.  Murmurs: None   · Jugular venous pulsation not appreciated   · Peripheral [facility], [place], [address] [facility] Cardiology Physician:      Attending Physician Statement:    I have discussed the care of  Cheo Bui , including pertinent history and exam findings, with the Cardiology fellow/resident. I have seen and examined the patient and the key elements of all parts of the encounter have been performed by me. I agree with the assessment, plan and orders as documented by the fellow/resident, after I modified exam findings and plan of treatments, and the final version is my approved version of the assessment.      Additional Comments:   Now in NSR , continue BB ,  Anticoagulation   OK to DC from cardiology and follow to his cardiologist   Dr. Sarah Beth River    Attending Name:

## 2017-12-07 NOTE — PROGRESS NOTES
Daily Progress Note     Admit Date: 12/4/2017  Bed/Room No.  3001/3001-01  Admitting Physician : Cabrera Holguin MD  Code Status :Full Code  Hospital Day:  LOS: 3 days   Complaint at Admission : Uncontrolled type 2 diabetes mellitus with hyperglycemia, scrotal abscess. Principal Problem:    Abscess, scrotum  Active Problems:    Severe uncontrolled diabetes mellitus ([facility]    Morbid obesity with BMI of 70 and over, adult ([facility] [facility]    Simple chronic bronchitis (HCC)    Subjective: Interval History/Significant events :  12/07/17    Patient had 17 beats run of V. tach last night. He was asymptomatic during the episode. He has history of paroxysmal atrial fibrillation and is on Eliquis, atenolol, Cardizem at home. Eliquis was on hold due to surgery in the hospital.  Patient has scrotal pain. He is unable to move without difficulty. Has Olson catheter in place. Patient remains afebrile. Dressing change done this morning. Vitals - Stable afebrile  Labs - hyperglycemia, hyponatremia,     Nursing notes , Consults notes reviewed. Overnight events and updates discussed with Nursing staff . Background history    Admitted for Abscess, scrotum , in hospital for 3 days . Mir Viyera 36 y.o. male  is admitted to the hospital for the management of Scrotal abscess, hyperglycemia and uncontrolled type 2 diabetes mellitus, morbid obesity. Patient was transferred from [facility] emergency room where he presented with scrotal wound. He has been having swelling in his scrotum for last few weeks. Pain was constant and worse with sitting. Patient denied any fever, chills. He denied any discharge. He does not have any dysuria, frequency, hematuria. Patient is morbidly obese and has underlying type 2 diabetes mellitus which is uncontrolled. Patient's initial evaluation over [facility] showed blood sugar more than 450 without ketoacidosis. He was found to have scrotal abscess.   Due to his weight patient was nerve deficit. He exhibits normal muscle tone. Skin: Skin is warm. No rash noted. He is not diaphoretic. Psychiatric: He has a normal mood and affect. His behavior is normal.   Nursing note and vitals reviewed. Lower Extremities : No ankle Edema , No calf Tenderness     Laboratory findings:    Recent Labs      12/05/17   0555  12/06/17   0742  12/07/17   0619   WBC  14.5*  12.3*  11.6*   HGB  12.0*  11.8*  11.7*   HCT  37.4*  37.4*  37.1*   PLT  278  284  311   INR  1.1   --    --      Recent Labs      12/05/17   0555  12/06/17   0742  12/06/17   2243  12/07/17   0619   NA  132*  132*  128*  133*   K  4.2  4.5  4.3  4.2   CL  94*  94*  90*  94*   CO2  23  25  25  27   GLUCOSE  347*  378*  348*  322*   BUN  28*  27*  31*  30*   CREATININE  0.98  0.89  1.09  0.96   MG  1.6  1.7  1.8  1.9   CALCIUM  8.0*  8.1*  8.1*  8.3*   CAION  0.98*  1.03*   --   1.15     Recent Labs      12/04/17   2321  12/05/17   0555  12/06/17   0742  12/07/17   0619   PROT   --   7.3  7.1  7.4   LABALBU   --   3.1*  3.0*  3.2*   LABA1C  12.3*   --    --    --    AST   --   14  13  13   ALT   --   18  18  17   ALKPHOS   --   65  63  63   BILITOT   --   0.51  0.40  0.35        Lab Results   Component Value Date    LABA1C 12.3 (H) 12/04/2017         No results found for: Elva Boland, RBCUA, BLOODU, BACTERIA, NITRU, WBCUA, LEUKOCYTESUR    Imaging/Diagonstics:           Clinical Course : gradually improving  Assessment and Plan      1. Scrotal abscess-continue imipenem - Continue dressing changes daily. Bactrim for 7 days. 2. Vtach - increase beta blocker. 3. PAF - in NSR, resume Eliquis, add aspirin. Continue Cardizem. Follow echocardiogram .  Cardiology consult  4. Uncontrolled type 2 diabetes mellitus with hyperglycemia without diabetic ketoacidosis- increase Lantus 70 units twice a day- humulin lispro 20 units premeal . high Insulin lispro sliding scale. A1c 12.3   5. COPD - albuterol as needed  6.  Current smoker-nicotine

## 2017-12-08 LAB
ABSOLUTE EOS #: 1.75 K/UL (ref 0–0.44)
ABSOLUTE IMMATURE GRANULOCYTE: 0.18 K/UL (ref 0–0.3)
ABSOLUTE LYMPH #: 1.42 K/UL (ref 1.1–3.7)
ABSOLUTE MONO #: 1.18 K/UL (ref 0.1–1.2)
ALBUMIN SERPL-MCNC: 2.9 G/DL (ref 3.5–5.2)
ALBUMIN/GLOBULIN RATIO: 0.6 (ref 1–2.5)
ALP BLD-CCNC: 63 U/L (ref 40–129)
ALT SERPL-CCNC: 16 U/L (ref 5–41)
ANION GAP SERPL CALCULATED.3IONS-SCNC: 13 MMOL/L (ref 9–17)
AST SERPL-CCNC: 16 U/L
BASOPHILS # BLD: 1 % (ref 0–2)
BASOPHILS ABSOLUTE: 0.12 K/UL (ref 0–0.2)
BILIRUB SERPL-MCNC: 0.36 MG/DL (ref 0.3–1.2)
BUN BLDV-MCNC: 33 MG/DL (ref 6–20)
BUN/CREAT BLD: ABNORMAL (ref 9–20)
CALCIUM IONIZED: 1.2 MMOL/L (ref 1.13–1.33)
CALCIUM SERPL-MCNC: 8.4 MG/DL (ref 8.6–10.4)
CHLORIDE BLD-SCNC: 94 MMOL/L (ref 98–107)
CO2: 25 MMOL/L (ref 20–31)
CREAT SERPL-MCNC: 1.06 MG/DL (ref 0.7–1.2)
DIFFERENTIAL TYPE: ABNORMAL
EKG ATRIAL RATE: 81 BPM
EKG P-R INTERVAL: 194 MS
EKG Q-T INTERVAL: 408 MS
EKG QRS DURATION: 126 MS
EKG QTC CALCULATION (BAZETT): 473 MS
EKG R AXIS: -61 DEGREES
EKG T AXIS: 31 DEGREES
EKG VENTRICULAR RATE: 81 BPM
EOSINOPHILS RELATIVE PERCENT: 15 % (ref 1–4)
GFR AFRICAN AMERICAN: >60 ML/MIN
GFR NON-AFRICAN AMERICAN: >60 ML/MIN
GFR SERPL CREATININE-BSD FRML MDRD: ABNORMAL ML/MIN/{1.73_M2}
GFR SERPL CREATININE-BSD FRML MDRD: ABNORMAL ML/MIN/{1.73_M2}
GLUCOSE BLD-MCNC: 253 MG/DL (ref 75–110)
GLUCOSE BLD-MCNC: 274 MG/DL (ref 70–99)
GLUCOSE BLD-MCNC: 274 MG/DL (ref 75–110)
GLUCOSE BLD-MCNC: 305 MG/DL (ref 75–110)
GLUCOSE BLD-MCNC: 320 MG/DL (ref 75–110)
HCT VFR BLD CALC: 37.4 % (ref 40.7–50.3)
HEMOGLOBIN: 11.4 G/DL (ref 13–17)
IMMATURE GRANULOCYTES: 2 %
LACTIC ACID, WHOLE BLOOD: 0.9 MMOL/L (ref 0.7–2.1)
LACTIC ACID, WHOLE BLOOD: 1.4 MMOL/L (ref 0.7–2.1)
LACTIC ACID, WHOLE BLOOD: 1.6 MMOL/L (ref 0.7–2.1)
LACTIC ACID, WHOLE BLOOD: 1.9 MMOL/L (ref 0.7–2.1)
LACTIC ACID: NORMAL MMOL/L
LV EF: 56 %
LVEF MODALITY: NORMAL
LYMPHOCYTES # BLD: 12 % (ref 24–43)
MAGNESIUM: 1.7 MG/DL (ref 1.6–2.6)
MCH RBC QN AUTO: 29.5 PG (ref 25.2–33.5)
MCHC RBC AUTO-ENTMCNC: 30.5 G/DL (ref 28.4–34.8)
MCV RBC AUTO: 96.6 FL (ref 82.6–102.9)
MONOCYTES # BLD: 10 % (ref 3–12)
PDW BLD-RTO: 12.4 % (ref 11.8–14.4)
PLATELET # BLD: 285 K/UL (ref 138–453)
PLATELET ESTIMATE: ABNORMAL
PMV BLD AUTO: 9.8 FL (ref 8.1–13.5)
POTASSIUM SERPL-SCNC: 4.8 MMOL/L (ref 3.7–5.3)
RBC # BLD: 3.87 M/UL (ref 4.21–5.77)
RBC # BLD: ABNORMAL 10*6/UL
SEG NEUTROPHILS: 61 % (ref 36–65)
SEGMENTED NEUTROPHILS ABSOLUTE COUNT: 7.18 K/UL (ref 1.5–8.1)
SODIUM BLD-SCNC: 132 MMOL/L (ref 135–144)
TOTAL PROTEIN: 7.4 G/DL (ref 6.4–8.3)
WBC # BLD: 11.8 K/UL (ref 3.5–11.3)
WBC # BLD: ABNORMAL 10*3/UL

## 2017-12-08 PROCEDURE — G8979 MOBILITY GOAL STATUS: HCPCS

## 2017-12-08 PROCEDURE — 6370000000 HC RX 637 (ALT 250 FOR IP): Performed by: NURSE PRACTITIONER

## 2017-12-08 PROCEDURE — 93306 TTE W/DOPPLER COMPLETE: CPT

## 2017-12-08 PROCEDURE — 82947 ASSAY GLUCOSE BLOOD QUANT: CPT

## 2017-12-08 PROCEDURE — 94762 N-INVAS EAR/PLS OXIMTRY CONT: CPT

## 2017-12-08 PROCEDURE — 94660 CPAP INITIATION&MGMT: CPT

## 2017-12-08 PROCEDURE — 97166 OT EVAL MOD COMPLEX 45 MIN: CPT

## 2017-12-08 PROCEDURE — 6360000002 HC RX W HCPCS: Performed by: NURSE PRACTITIONER

## 2017-12-08 PROCEDURE — 2060000000 HC ICU INTERMEDIATE R&B

## 2017-12-08 PROCEDURE — 6370000000 HC RX 637 (ALT 250 FOR IP): Performed by: INTERNAL MEDICINE

## 2017-12-08 PROCEDURE — 99211 OFF/OP EST MAY X REQ PHY/QHP: CPT

## 2017-12-08 PROCEDURE — 97161 PT EVAL LOW COMPLEX 20 MIN: CPT

## 2017-12-08 PROCEDURE — 85025 COMPLETE CBC W/AUTO DIFF WBC: CPT

## 2017-12-08 PROCEDURE — 6370000000 HC RX 637 (ALT 250 FOR IP): Performed by: FAMILY MEDICINE

## 2017-12-08 PROCEDURE — G8988 SELF CARE GOAL STATUS: HCPCS

## 2017-12-08 PROCEDURE — 83605 ASSAY OF LACTIC ACID: CPT

## 2017-12-08 PROCEDURE — 83735 ASSAY OF MAGNESIUM: CPT

## 2017-12-08 PROCEDURE — 36415 COLL VENOUS BLD VENIPUNCTURE: CPT

## 2017-12-08 PROCEDURE — 2580000003 HC RX 258: Performed by: FAMILY MEDICINE

## 2017-12-08 PROCEDURE — 80053 COMPREHEN METABOLIC PANEL: CPT

## 2017-12-08 PROCEDURE — 99232 SBSQ HOSP IP/OBS MODERATE 35: CPT | Performed by: FAMILY MEDICINE

## 2017-12-08 PROCEDURE — 6360000002 HC RX W HCPCS: Performed by: FAMILY MEDICINE

## 2017-12-08 PROCEDURE — G8987 SELF CARE CURRENT STATUS: HCPCS

## 2017-12-08 PROCEDURE — 97530 THERAPEUTIC ACTIVITIES: CPT

## 2017-12-08 PROCEDURE — 82330 ASSAY OF CALCIUM: CPT

## 2017-12-08 PROCEDURE — G8978 MOBILITY CURRENT STATUS: HCPCS

## 2017-12-08 PROCEDURE — 97535 SELF CARE MNGMENT TRAINING: CPT

## 2017-12-08 RX ORDER — METOPROLOL TARTRATE 100 MG/1
100 TABLET ORAL 2 TIMES DAILY
Qty: 60 TABLET | Refills: 1 | Status: SHIPPED | OUTPATIENT
Start: 2017-12-08 | End: 2018-04-14 | Stop reason: ALTCHOICE

## 2017-12-08 RX ORDER — DILTIAZEM HYDROCHLORIDE 120 MG/1
120 CAPSULE, COATED, EXTENDED RELEASE ORAL ONCE
Status: COMPLETED | OUTPATIENT
Start: 2017-12-08 | End: 2017-12-08

## 2017-12-08 RX ORDER — INSULIN GLARGINE 100 [IU]/ML
80 INJECTION, SOLUTION SUBCUTANEOUS 2 TIMES DAILY
Qty: 1 VIAL | Refills: 3 | Status: SHIPPED | OUTPATIENT
Start: 2017-12-08 | End: 2018-04-14 | Stop reason: ALTCHOICE

## 2017-12-08 RX ORDER — OXYCODONE AND ACETAMINOPHEN 7.5; 325 MG/1; MG/1
1 TABLET ORAL EVERY 8 HOURS PRN
Qty: 20 TABLET | Refills: 0 | Status: SHIPPED | OUTPATIENT
Start: 2017-12-08 | End: 2017-12-11

## 2017-12-08 RX ORDER — ASPIRIN 81 MG/1
81 TABLET ORAL DAILY
Qty: 30 TABLET | Refills: 3 | Status: SHIPPED | OUTPATIENT
Start: 2017-12-09

## 2017-12-08 RX ORDER — SULFAMETHOXAZOLE AND TRIMETHOPRIM 800; 160 MG/1; MG/1
1 TABLET ORAL EVERY 12 HOURS SCHEDULED
Qty: 14 TABLET | Refills: 0 | DISCHARGE
Start: 2017-12-08 | End: 2017-12-11 | Stop reason: HOSPADM

## 2017-12-08 RX ORDER — DILTIAZEM HYDROCHLORIDE 180 MG/1
360 CAPSULE, COATED, EXTENDED RELEASE ORAL DAILY
Status: DISCONTINUED | OUTPATIENT
Start: 2017-12-09 | End: 2017-12-13 | Stop reason: HOSPADM

## 2017-12-08 RX ORDER — MORPHINE SULFATE 4 MG/ML
4 INJECTION, SOLUTION INTRAMUSCULAR; INTRAVENOUS
Status: DISCONTINUED | OUTPATIENT
Start: 2017-12-08 | End: 2017-12-13 | Stop reason: HOSPADM

## 2017-12-08 RX ORDER — MORPHINE SULFATE 2 MG/ML
2 INJECTION, SOLUTION INTRAMUSCULAR; INTRAVENOUS
Status: DISCONTINUED | OUTPATIENT
Start: 2017-12-08 | End: 2017-12-13 | Stop reason: HOSPADM

## 2017-12-08 RX ADMIN — MORPHINE SULFATE 2 MG: 2 INJECTION, SOLUTION INTRAMUSCULAR; INTRAVENOUS at 05:48

## 2017-12-08 RX ADMIN — ANTACID TABLETS 500 MG: 500 TABLET, CHEWABLE ORAL at 21:42

## 2017-12-08 RX ADMIN — FUROSEMIDE 40 MG: 40 TABLET ORAL at 21:35

## 2017-12-08 RX ADMIN — OXYCODONE HYDROCHLORIDE AND ACETAMINOPHEN 2 TABLET: 5; 325 TABLET ORAL at 14:26

## 2017-12-08 RX ADMIN — ASPIRIN 81 MG: 81 TABLET, COATED ORAL at 08:29

## 2017-12-08 RX ADMIN — Medication 10 ML: at 21:42

## 2017-12-08 RX ADMIN — MORPHINE SULFATE 2 MG: 2 INJECTION, SOLUTION INTRAMUSCULAR; INTRAVENOUS at 21:36

## 2017-12-08 RX ADMIN — INSULIN LISPRO 8 UNITS: 100 INJECTION, SOLUTION INTRAVENOUS; SUBCUTANEOUS at 12:49

## 2017-12-08 RX ADMIN — METOPROLOL TARTRATE 50 MG: 50 TABLET, FILM COATED ORAL at 08:28

## 2017-12-08 RX ADMIN — METOLAZONE 2.5 MG: 2.5 TABLET ORAL at 08:28

## 2017-12-08 RX ADMIN — INSULIN LISPRO 6 UNITS: 100 INJECTION, SOLUTION INTRAVENOUS; SUBCUTANEOUS at 08:45

## 2017-12-08 RX ADMIN — SULFAMETHOXAZOLE AND TRIMETHOPRIM 1 TABLET: 800; 160 TABLET ORAL at 21:35

## 2017-12-08 RX ADMIN — MORPHINE SULFATE 2 MG: 2 INJECTION, SOLUTION INTRAMUSCULAR; INTRAVENOUS at 01:25

## 2017-12-08 RX ADMIN — METOPROLOL TARTRATE 50 MG: 50 TABLET, FILM COATED ORAL at 21:52

## 2017-12-08 RX ADMIN — INSULIN LISPRO 20 UNITS: 100 INJECTION, SOLUTION INTRAVENOUS; SUBCUTANEOUS at 12:47

## 2017-12-08 RX ADMIN — OXYCODONE HYDROCHLORIDE AND ACETAMINOPHEN 2 TABLET: 5; 325 TABLET ORAL at 10:25

## 2017-12-08 RX ADMIN — APIXABAN 5 MG: 5 TABLET, FILM COATED ORAL at 08:28

## 2017-12-08 RX ADMIN — DILTIAZEM HYDROCHLORIDE 120 MG: 120 CAPSULE, COATED, EXTENDED RELEASE ORAL at 14:27

## 2017-12-08 RX ADMIN — INSULIN GLARGINE 75 UNITS: 100 INJECTION, SOLUTION SUBCUTANEOUS at 21:48

## 2017-12-08 RX ADMIN — INSULIN GLARGINE 75 UNITS: 100 INJECTION, SOLUTION SUBCUTANEOUS at 08:42

## 2017-12-08 RX ADMIN — DILTIAZEM HYDROCHLORIDE 240 MG: 240 CAPSULE, COATED, EXTENDED RELEASE ORAL at 08:28

## 2017-12-08 RX ADMIN — OXYCODONE HYDROCHLORIDE AND ACETAMINOPHEN 2 TABLET: 5; 325 TABLET ORAL at 05:02

## 2017-12-08 RX ADMIN — MORPHINE SULFATE 2 MG: 2 INJECTION, SOLUTION INTRAMUSCULAR; INTRAVENOUS at 08:14

## 2017-12-08 RX ADMIN — APIXABAN 5 MG: 5 TABLET, FILM COATED ORAL at 21:35

## 2017-12-08 RX ADMIN — POTASSIUM CHLORIDE 20 MEQ: 20 TABLET, EXTENDED RELEASE ORAL at 21:35

## 2017-12-08 RX ADMIN — LOSARTAN POTASSIUM 100 MG: 50 TABLET, FILM COATED ORAL at 08:29

## 2017-12-08 RX ADMIN — Medication 10 ML: at 08:30

## 2017-12-08 RX ADMIN — OXYCODONE HYDROCHLORIDE AND ACETAMINOPHEN 2 TABLET: 5; 325 TABLET ORAL at 18:39

## 2017-12-08 RX ADMIN — INSULIN LISPRO 6 UNITS: 100 INJECTION, SOLUTION INTRAVENOUS; SUBCUTANEOUS at 18:26

## 2017-12-08 RX ADMIN — ANTACID TABLETS 500 MG: 500 TABLET, CHEWABLE ORAL at 08:28

## 2017-12-08 RX ADMIN — ANTACID TABLETS 500 MG: 500 TABLET, CHEWABLE ORAL at 18:39

## 2017-12-08 RX ADMIN — INSULIN LISPRO 20 UNITS: 100 INJECTION, SOLUTION INTRAVENOUS; SUBCUTANEOUS at 18:26

## 2017-12-08 RX ADMIN — FUROSEMIDE 40 MG: 40 TABLET ORAL at 08:28

## 2017-12-08 RX ADMIN — INSULIN LISPRO 4 UNITS: 100 INJECTION, SOLUTION INTRAVENOUS; SUBCUTANEOUS at 21:48

## 2017-12-08 RX ADMIN — POTASSIUM CHLORIDE 20 MEQ: 20 TABLET, EXTENDED RELEASE ORAL at 08:29

## 2017-12-08 RX ADMIN — SULFAMETHOXAZOLE AND TRIMETHOPRIM 1 TABLET: 800; 160 TABLET ORAL at 08:29

## 2017-12-08 RX ADMIN — INSULIN LISPRO 20 UNITS: 100 INJECTION, SOLUTION INTRAVENOUS; SUBCUTANEOUS at 08:43

## 2017-12-08 ASSESSMENT — ENCOUNTER SYMPTOMS
COUGH: 0
CONSTIPATION: 0
ABDOMINAL PAIN: 0
VOMITING: 0
SINUS PRESSURE: 0
VOICE CHANGE: 0
DIARRHEA: 0
BLOOD IN STOOL: 0
NAUSEA: 0
SORE THROAT: 0
WHEEZING: 0
SHORTNESS OF BREATH: 0

## 2017-12-08 ASSESSMENT — PAIN DESCRIPTION - LOCATION
LOCATION: SCROTUM
LOCATION: SCROTUM
LOCATION: BACK;SCROTUM

## 2017-12-08 ASSESSMENT — PAIN SCALES - GENERAL
PAINLEVEL_OUTOF10: 10
PAINLEVEL_OUTOF10: 9
PAINLEVEL_OUTOF10: 8
PAINLEVEL_OUTOF10: 9
PAINLEVEL_OUTOF10: 9
PAINLEVEL_OUTOF10: 10
PAINLEVEL_OUTOF10: 8
PAINLEVEL_OUTOF10: 9
PAINLEVEL_OUTOF10: 10
PAINLEVEL_OUTOF10: 10

## 2017-12-08 ASSESSMENT — PAIN DESCRIPTION - PAIN TYPE
TYPE: ACUTE PAIN;SURGICAL PAIN
TYPE: ACUTE PAIN;CHRONIC PAIN
TYPE: ACUTE PAIN

## 2017-12-08 ASSESSMENT — PAIN DESCRIPTION - ORIENTATION: ORIENTATION: RIGHT

## 2017-12-08 NOTE — PROGRESS NOTES
hours.    Invalid input(s): LDLCALCU  INR: No results for input(s): INR in the last 72 hours. Objective:   Vitals: BP (!) 121/52   Pulse 121   Temp 97.7 °F (36.5 °C) (Oral)   Resp 20   Ht 6' 1\" (1.854 m)   Wt (!) 578 lb 3.2 oz (262.3 kg)   SpO2 98%   BMI 76.28 kg/m²   General appearance: alert and cooperative with exam  HEENT: Head: Normocephalic, no lesions, without obvious abnormality. Neck: no JVD, trachea midline, no adenopathy  Lungs: Clear to auscultation  Heart: Irregular rate and rhythm, s1/s2 auscultated, no murmurs. Telemetry reviewed. Rates >100 while at rest  Abdomen: soft, non-tender, bowel sounds active  Extremities: no edema  Neurologic: not done        Assessment / Acute Cardiac Problems:   1. Atrial Fibrillation  2. Hypertension  3. Dm2  4. Morbid obesity    Patient Active Problem List:     Abscess, scrotum     Severe uncontrolled diabetes mellitus ([address]     Morbid obesity with BMI of 70 and over, adult ([address]     Simple chronic bronchitis ([address]      Plan of Treatment:   1. Paroxysmal Atrial Fibrillation - Has some SR last night but now back in Afib. Continue PO Coreg, ASA, & Eliquis. Increase Cardizem to 360 daily. Will arrange OP f/u   2. Hypertension - stable. Continue Bb, Losartan, & Zaroxolyn. 3. Morbid obesity - discussed importance of diet and exercise. Questions and concerns addressed. 4. OK to discharge from cardiology standpoint. F/U as outpatient.     Electronically signed by ABIMAEL Bazan on 12/8/2017 at 10:36 AM  [address]  [phone]

## 2017-12-08 NOTE — PROGRESS NOTES
Daily Progress Note     Admit Date: 12/4/2017  Bed/Room No.  3001/3001-01  Admitting Physician : Mandy Medrano MD  Code Status :Full Code  Hospital Day:  LOS: 4 days   Complaint at Admission : Uncontrolled type 2 diabetes mellitus with hyperglycemia, scrotal abscess. Principal Problem:    Abscess, scrotum  Active Problems:    Severe uncontrolled diabetes mellitus ([address]    Morbid obesity with BMI of 70 and over, adult ([address]    Simple chronic bronchitis (HCC)    Subjective: Interval History/Significant events :  12/08/17    Patient Remains in normal sinus rhythm. Heart rate 100 - 110. Patient denies any palpitations, shortness of breath. He had dressing changes morning and tolerated okay. He remains afebrile. Still having difficulty in ambulating. Vitals - Stable afebrile  Labs - hyperglycemia, hyponatremia,     Nursing notes , Consults notes reviewed. Overnight events and updates discussed with Nursing staff . Background history    Admitted for Abscess, scrotum , in hospital for 4 days . Britta Lo 36 y.o. male  is admitted to the hospital for the management of Scrotal abscess, hyperglycemia and uncontrolled type 2 diabetes mellitus, morbid obesity. Patient was transferred from [facility] emergency room where he presented with scrotal wound. He has been having swelling in his scrotum for last few weeks. Pain was constant and worse with sitting. Patient denied any fever, chills. He denied any discharge. He does not have any dysuria, frequency, hematuria. Patient is morbidly obese and has underlying type 2 diabetes mellitus which is uncontrolled. Patient's initial evaluation over [facility] showed blood sugar more than 450 without ketoacidosis. He was found to have scrotal abscess. Due to his weight patient was transferred to [facility] for urological evaluation. Patient underwent I&D on 12/5/17. Patient had 17 beat run of V. tach on 12/6/17.  Atenolol was changed to metoprolol and dose was increased. Patient had paroxysmal atrial fibrillation . He is on long-term anticoagulation with Eliquis. PMH:  Past Medical History:   Diagnosis Date    Arthritis     of the knees    Asthma     Blood circulation, collateral     COPD (chronic obstructive pulmonary disease) ([facility]     Former smoker     Hypertension     Morbid obesity ([facility] [address] [facility]     ETTA (obstructive sleep apnea)     PAF (paroxysmal atrial fibrillation) ([facility] [address]     Pneumonia     Uncontrolled diabetes mellitus with hyperglycemia ([facility]       Allergies: Allergies   Allergen Reactions    Zosyn [Piperacillin Sod-Tazobactam So] Itching and Other (See Comments)     Redness      Medications :    aspirin 81 mg Oral Daily   apixaban 5 mg Oral BID   metoprolol tartrate 50 mg Oral BID   insulin glargine 75 Units Subcutaneous BID   insulin lispro 20 Units Subcutaneous TID WC   sulfamethoxazole-trimethoprim 1 tablet Oral 2 times per day   insulin lispro 0-12 Units Subcutaneous TID WC   insulin lispro 0-6 Units Subcutaneous Nightly   furosemide 40 mg Oral BID   losartan 100 mg Oral Daily   metolazone 2.5 mg Oral Daily   potassium chloride 20 mEq Oral BID   diltiazem 240 mg Oral Daily   sodium chloride flush 10 mL Intravenous 2 times per day       Review of Systems   Review of Systems   Constitutional: Negative for activity change, appetite change, chills, fatigue, fever and unexpected weight change. HENT: Negative for congestion, mouth sores, postnasal drip, sinus pressure, sore throat and voice change. Eyes: Negative for visual disturbance. Respiratory: Negative for cough, shortness of breath and wheezing. Cardiovascular: Negative for chest pain and palpitations. Gastrointestinal: Negative for abdominal pain, blood in stool, constipation, diarrhea, nausea and vomiting. Endocrine: Negative for polyuria. Genitourinary: Positive for scrotal swelling. Negative for difficulty urinating, dysuria, frequency and urgency.    Musculoskeletal: Negative for arthralgias, joint swelling and myalgias. Neurological: Negative for dizziness, tremors, speech difficulty, light-headedness and headaches. Objective:   Current Vitals : Temp: 97.7 °F (36.5 °C),  Pulse: 98, Resp: 16, BP: (!) 121/52, SpO2: 94 %  Last 24 Hrs Vitals   Patient Vitals for the past 24 hrs:   BP Temp Temp src Pulse Resp SpO2   12/08/17 0628 (!) 121/52 97.7 °F (36.5 °C) Oral - - -   12/08/17 0329 - - - - 16 -   12/08/17 0205 - - - - 16 -   12/08/17 0007 119/74 - - 98 18 94 %   12/07/17 2038 (!) 147/64 - - 94 16 -   12/07/17 1826 - 99.3 °F (37.4 °C) Oral - - -     Intake / output   12/07 0701 - 12/08 0700  In: 1200 [P.O.:1200]  Out: 3225 [Urine:3225]  Physical Exam:  Physical Exam   Constitutional: He is oriented to person, place, and time. He appears well-developed and well-nourished. No distress. HENT:   Mouth/Throat: Oropharynx is clear and moist. No oropharyngeal exudate. Eyes: Pupils are equal, round, and reactive to light. No scleral icterus. Neck: Neck supple. No JVD present. No tracheal deviation present. No thyromegaly present. Cardiovascular: Normal rate, regular rhythm, normal heart sounds and intact distal pulses. Exam reveals no gallop and no friction rub. No murmur heard. Pulmonary/Chest: Effort normal and breath sounds normal. No respiratory distress. He has no wheezes. He has no rales. He exhibits no tenderness. Abdominal: Soft. Bowel sounds are normal. He exhibits no mass. There is no tenderness. There is no rebound and no guarding. Genitourinary:   Genitourinary Comments: Olson catheter in place. Scrotal turban dressing in place. Lymphadenopathy:     He has no cervical adenopathy. Neurological: He is alert and oriented to person, place, and time. No cranial nerve deficit. He exhibits normal muscle tone. Skin: Skin is warm. No rash noted. He is not diaphoretic. Psychiatric: He has a normal mood and affect.  His behavior is normal.   Nursing note

## 2017-12-08 NOTE — PLAN OF CARE
[facility] [address] 18    Second Visit Note  For more detailed information please refer to the progress note of the day      12/8/2017    4:15 PM    Name:   Anastasiya Schaeffer  MRN:     [id number]     [place]:      [de-identified]   Room:   [address] [place] [address] Day:  4  Admit Date:  12/4/2017  9:35 PM    PCP:   Estanislao Hamman, MD  Code Status:  Full Code        Pt vitals were reviewed   New labs were reviewed   Patient was seen    Updated plan :     1. Patient is still having difficulty in ambulating and self-care. Will not be able to do scrotal  daily dressing. Placement  pending. Will need pre certification.  Likely may happen on Monday 12/11/17         Oleksandr Colmenares MD  12/8/2017  4:15 PM

## 2017-12-08 NOTE — PROGRESS NOTES
Independent  Homemaking Assistance: Independent  Homemaking Responsibilities: Yes  Ambulation Assistance: Independent  Transfer Assistance: Independent  Active : Yes  Mode of Transportation: Car  Occupation: Full time employment  Type of occupation:  at [employer] in [address]: Within Functional Limits  Hearing: Within functional limits    Orientation  Overall Orientation Status: Within Functional Limits  Observation/Palpation  Posture: Fair  Balance  Sitting Balance: Contact guard assistance  Standing Balance: Contact guard assistance  Standing Balance  Sit to stand: Contact guard assistance  Stand to sit: Contact guard assistance  Functional Mobility  Functional - Mobility Device: No device  Activity: Other (bed to chair)  Assist Level: Contact guard assistance  Functional Mobility Comments: Taken from PT evaluation this date d/t RN and pt requesting no OOB activity d/t pain   ADL  Feeding: Independent  Grooming: Independent (to wash and comb hair)  UE Bathing: Stand by assistance (pt was chest and B UE)  LE Bathing: Moderate assistance (to wash B LE)  UE Dressing: Stand by assistance (to don/ doff gown)  LE Dressing: Maximum assistance (to don/ doff socks)  Additional Comments: Pt supine in bed on arrival. Pt assisted to complete ADL activites as documented above, per RN and pt u in chair for aprox 2 hours, to remain in bed. Pt reporting 10/10 pain making it difficult to move. Pt remained in bed, call light in reach and RN notiifed on therapist exit.    Tone RUE  RUE Tone: Normotonic  Tone LUE  LUE Tone: Normotonic  Coordination  Movements Are Fluid And Coordinated: Yes     Bed mobility  Rolling to Right: Minimal assistance  Supine to Sit: Moderate assistance  Sit to Supine: Contact guard assistance  Comment: Taken from PT evaluation this date just prior to OT evaluation  Transfers  Stand Step Transfers: Contact guard assistance  Sit to stand: Contact guard assistance  Stand to sit: Contact guard assistance  Transfer Comments: Taken from PT evaluation this date     Cognition  Overall Cognitive Status: WFL  Perception  Overall Perceptual Status: WFL     Sensation  Overall Sensation Status: Impaired  Additional Comments: Numbness B feet     LUE AROM (degrees)  LUE AROM : WFL  RUE AROM (degrees)  RUE AROM : WFL  LUE Strength  Gross LUE Strength: WFL  RUE Strength  Gross RUE Strength: WFL    Assessment   Performance deficits / Impairments: Decreased functional mobility ; Decreased endurance;Decreased ADL status  Assessment: Pt would benefit from additional acute care and post-acute care therapy services prior to a safe discharge to previous living environment to address moderate deficits in ADL/ functional activities and decreased endurance. Pt limited in mobility d/t pain on this date. Prognosis: Good  Decision Making: Medium Complexity  Patient Education: OT POC, purpose of evaluation  REQUIRES OT FOLLOW UP: Yes  Activity Tolerance  Activity Tolerance: Patient Tolerated treatment well;Patient limited by pain  Safety Devices  Safety Devices in place: Yes  Type of devices: Nurse notified;Call light within reach; Patient at risk for falls; Left in bed  Restraints  Initially in place: No  OT Equipment Recommendations  Equipment Needed: No         Plan   Plan  Times per week: 3-4x/wk  Current Treatment Recommendations: Functional Mobility Training, Endurance Training, Safety Education & Training, Self-Care / ADL, Patient/Caregiver Education & Training, Equipment Evaluation, Education, & procurement    G-Code  OT G-codes  Functional Assessment Tool Used: Barthel Index   Score: 12/20  Functional Limitation: Self care  Self Care Current Status (): At least 40 percent but less than 60 percent impaired, limited or restricted  Self Care Goal Status ():  At least 20 percent but less than 40 percent impaired, limited or restricted     Goals  Short term goals  Time Frame for

## 2017-12-08 NOTE — PROGRESS NOTES
employment  Objective          AROM RLE (degrees)  RLE AROM: WFL  AROM LLE (degrees)  LLE AROM : WFL  AROM RUE (degrees)  RUE AROM : WFL  AROM LUE (degrees)  LUE AROM : WFL  Strength RLE  Strength RLE: WFL  Strength LLE  Strength LLE: WFL  Strength RUE  Strength RUE: WFL  Strength LUE  Strength LUE: WFL     Sensation  Overall Sensation Status: Impaired  Additional Comments: Numbness B feet  Bed mobility  Rolling to Right: Minimal assistance  Supine to Sit: Moderate assistance  Sit to Supine: Contact guard assistance  Comment: pt limited by scrotal pain with bed mobility  Transfers  Sit to Stand: Contact guard assistance  Stand to sit: Contact guard assistance  Bed to Chair: Contact guard assistance  Ambulation  Ambulation?: Yes  More Ambulation?: No  Ambulation 1  Surface: level tile  Device: No Device  Assistance: Contact guard assistance  Quality of Gait: slow gait, limited by pain  Distance: 5 ft to chair  Comments: Dressing just replaced, remained in room  Stairs/Curb  Stairs?: No     Balance  Posture: Good  Sitting - Static: Good  Sitting - Dynamic: Good  Standing - Static: Fair;+  Standing - Dynamic: Fair    Pt up to standing x 4-5 minutes at side of bed with fair plus balance. Into chair for lunch. Educated pt on importance of OOB and ROM to maintain strength and function  Assessment   Body structures, Functions, Activity limitations: Decreased functional mobility   Assessment: Pt demonstrated difficulty with bed mobility and limited ambulation due to pain.  Home alone throughout the day, would be limited inability to care for himself  Prognosis: Good  Decision Making: Low Complexity  REQUIRES PT FOLLOW UP: Yes  Activity Tolerance  Activity Tolerance: Patient limited by pain  PT Equipment Recommendations  Equipment Needed: No         Plan   Plan  Times per week: 5-6x/week  Current Treatment Recommendations: Strengthening, Balance Training, Functional Mobility Training, Stair training, Gait Training, Transfer

## 2017-12-08 NOTE — ANESTHESIA POST-OP
plan is snf placed call luis f with [facility] to see if patient is accepted will check with administration and will get back to me. PT/OT are not working with this patient because there is a strict br order ps dr Alyson Donald to remove.  Will need precert for snf placement    12:55 faxed current med list and ostomy notes  2:39 faxed pt notes [facility] will accept precert started today

## 2017-12-08 NOTE — PROGRESS NOTES
[Piperacillin Sod-Tazobactam So] Itching and Other (See Comments)     Redness           Objective:      BP (!) 121/52   Pulse 121   Temp 97.7 °F (36.5 °C) (Oral)   Resp 20   Ht 6' 1\" (1.854 m)   Wt (!) 578 lb 3.2 oz (262.3 kg)   SpO2 98%   BMI 76.28 kg/m²   Prakash Risk Score: Prakash Scale Score: 17    LABS    CBC:   Lab Results   Component Value Date    WBC 11.8 12/08/2017    RBC 3.87 12/08/2017    HGB 11.4 12/08/2017     CMP:  Albumin:    Lab Results   Component Value Date    LABALBU 2.9 12/08/2017     PT/INR:    Lab Results   Component Value Date    PROTIME 11.8 12/05/2017    INR 1.1 12/05/2017     HgBA1c:    Lab Results   Component Value Date    LABA1C 12.3 12/04/2017     PTT: No components found for: LABPTT      Assessment:     Patient Active Problem List   Diagnosis    Abscess, scrotum    Severe uncontrolled diabetes mellitus [facility]    Morbid obesity with BMI of 70 and over, adult ([facility]    Simple chronic bronchitis ([facility]       Measurements:  Incision 12/05/17 Scrotum (Active)   Wound Assessment Clean;Painful;Red 12/8/2017 10:00 AM   Talisha-wound Assessment Clean;Dry 12/8/2017 10:00 AM   Wound Length (cm) 6 cm 12/8/2017 10:00 AM   Wound Width (cm) 3 cm 12/8/2017 10:00 AM   Wound Depth (cm)  2 12/8/2017 10:00 AM   Closure None 12/8/2017 10:00 AM   Drainage Amount Moderate 12/8/2017 10:00 AM   Drainage Description Serosanguinous 12/8/2017 10:00 AM   Odor None 12/8/2017 10:00 AM   Dressing/Treatment Alginate;Packing;Silver dressing 12/8/2017 10:00 AM   Dressing Changed Changed/New 12/8/2017 10:00 AM   Dressing Status Dry; Intact; Old drainage 12/8/2017  4:00 AM   Dressing Change Due 12/09/17 12/8/2017 10:00 AM   Number of days: 2       Plan of Care:   Irrigate right scrotal wound with NS  gently pack with Aquacel Ag Extra silver hydrofiber  Cover with ABD  Secure with roll gauze, stockinette, or underwear.   Elevate scrotum          Specialty Bed Required : Yes   [x] Low Air Loss   [x] Pressure

## 2017-12-09 LAB
ABSOLUTE EOS #: 1.53 K/UL (ref 0–0.44)
ABSOLUTE IMMATURE GRANULOCYTE: 0.26 K/UL (ref 0–0.3)
ABSOLUTE LYMPH #: 1.3 K/UL (ref 1.1–3.7)
ABSOLUTE MONO #: 1.04 K/UL (ref 0.1–1.2)
ALBUMIN SERPL-MCNC: 2.9 G/DL (ref 3.5–5.2)
ALBUMIN/GLOBULIN RATIO: 0.6 (ref 1–2.5)
ALP BLD-CCNC: 65 U/L (ref 40–129)
ALT SERPL-CCNC: 17 U/L (ref 5–41)
ANION GAP SERPL CALCULATED.3IONS-SCNC: 12 MMOL/L (ref 9–17)
AST SERPL-CCNC: 17 U/L
BASOPHILS # BLD: 1 % (ref 0–2)
BASOPHILS ABSOLUTE: 0.08 K/UL (ref 0–0.2)
BILIRUB SERPL-MCNC: 0.3 MG/DL (ref 0.3–1.2)
BUN BLDV-MCNC: 32 MG/DL (ref 6–20)
BUN/CREAT BLD: ABNORMAL (ref 9–20)
CALCIUM IONIZED: 1.19 MMOL/L (ref 1.13–1.33)
CALCIUM SERPL-MCNC: 9 MG/DL (ref 8.6–10.4)
CHLORIDE BLD-SCNC: 91 MMOL/L (ref 98–107)
CO2: 26 MMOL/L (ref 20–31)
CREAT SERPL-MCNC: 1.13 MG/DL (ref 0.7–1.2)
DIFFERENTIAL TYPE: ABNORMAL
EOSINOPHILS RELATIVE PERCENT: 14 % (ref 1–4)
GFR AFRICAN AMERICAN: >60 ML/MIN
GFR NON-AFRICAN AMERICAN: >60 ML/MIN
GFR SERPL CREATININE-BSD FRML MDRD: ABNORMAL ML/MIN/{1.73_M2}
GFR SERPL CREATININE-BSD FRML MDRD: ABNORMAL ML/MIN/{1.73_M2}
GLUCOSE BLD-MCNC: 259 MG/DL (ref 75–110)
GLUCOSE BLD-MCNC: 282 MG/DL (ref 75–110)
GLUCOSE BLD-MCNC: 315 MG/DL (ref 75–110)
GLUCOSE BLD-MCNC: 341 MG/DL (ref 75–110)
GLUCOSE BLD-MCNC: 358 MG/DL (ref 70–99)
HCT VFR BLD CALC: 34.6 % (ref 40.7–50.3)
HEMOGLOBIN: 11.3 G/DL (ref 13–17)
IMMATURE GRANULOCYTES: 2 %
LACTIC ACID, WHOLE BLOOD: 1.6 MMOL/L (ref 0.7–2.1)
LACTIC ACID: NORMAL MMOL/L
LYMPHOCYTES # BLD: 12 % (ref 24–43)
MAGNESIUM: 1.4 MG/DL (ref 1.6–2.6)
MCH RBC QN AUTO: 29.2 PG (ref 25.2–33.5)
MCHC RBC AUTO-ENTMCNC: 32.7 G/DL (ref 28.4–34.8)
MCV RBC AUTO: 89.4 FL (ref 82.6–102.9)
MONOCYTES # BLD: 9 % (ref 3–12)
PDW BLD-RTO: 12.2 % (ref 11.8–14.4)
PLATELET # BLD: 315 K/UL (ref 138–453)
PLATELET ESTIMATE: ABNORMAL
PMV BLD AUTO: 9.5 FL (ref 8.1–13.5)
POTASSIUM SERPL-SCNC: 4.6 MMOL/L (ref 3.7–5.3)
RBC # BLD: 3.87 M/UL (ref 4.21–5.77)
RBC # BLD: ABNORMAL 10*6/UL
SEG NEUTROPHILS: 62 % (ref 36–65)
SEGMENTED NEUTROPHILS ABSOLUTE COUNT: 6.85 K/UL (ref 1.5–8.1)
SODIUM BLD-SCNC: 129 MMOL/L (ref 135–144)
TOTAL PROTEIN: 7.4 G/DL (ref 6.4–8.3)
WBC # BLD: 11.1 K/UL (ref 3.5–11.3)
WBC # BLD: ABNORMAL 10*3/UL

## 2017-12-09 PROCEDURE — 80053 COMPREHEN METABOLIC PANEL: CPT

## 2017-12-09 PROCEDURE — 2060000000 HC ICU INTERMEDIATE R&B

## 2017-12-09 PROCEDURE — 6360000002 HC RX W HCPCS: Performed by: INTERNAL MEDICINE

## 2017-12-09 PROCEDURE — 2580000003 HC RX 258: Performed by: FAMILY MEDICINE

## 2017-12-09 PROCEDURE — 82330 ASSAY OF CALCIUM: CPT

## 2017-12-09 PROCEDURE — 82947 ASSAY GLUCOSE BLOOD QUANT: CPT

## 2017-12-09 PROCEDURE — 6370000000 HC RX 637 (ALT 250 FOR IP): Performed by: INTERNAL MEDICINE

## 2017-12-09 PROCEDURE — 6370000000 HC RX 637 (ALT 250 FOR IP): Performed by: NURSE PRACTITIONER

## 2017-12-09 PROCEDURE — 85025 COMPLETE CBC W/AUTO DIFF WBC: CPT

## 2017-12-09 PROCEDURE — 99232 SBSQ HOSP IP/OBS MODERATE 35: CPT | Performed by: INTERNAL MEDICINE

## 2017-12-09 PROCEDURE — 83605 ASSAY OF LACTIC ACID: CPT

## 2017-12-09 PROCEDURE — 94660 CPAP INITIATION&MGMT: CPT

## 2017-12-09 PROCEDURE — 6370000000 HC RX 637 (ALT 250 FOR IP): Performed by: FAMILY MEDICINE

## 2017-12-09 PROCEDURE — 83735 ASSAY OF MAGNESIUM: CPT

## 2017-12-09 PROCEDURE — 36415 COLL VENOUS BLD VENIPUNCTURE: CPT

## 2017-12-09 PROCEDURE — 94762 N-INVAS EAR/PLS OXIMTRY CONT: CPT

## 2017-12-09 PROCEDURE — 6360000002 HC RX W HCPCS: Performed by: NURSE PRACTITIONER

## 2017-12-09 RX ORDER — ONDANSETRON 2 MG/ML
4 INJECTION INTRAMUSCULAR; INTRAVENOUS EVERY 6 HOURS PRN
Status: DISCONTINUED | OUTPATIENT
Start: 2017-12-09 | End: 2017-12-13 | Stop reason: HOSPADM

## 2017-12-09 RX ORDER — MAGNESIUM SULFATE 1 G/100ML
1 INJECTION INTRAVENOUS PRN
Status: DISCONTINUED | OUTPATIENT
Start: 2017-12-09 | End: 2017-12-13 | Stop reason: HOSPADM

## 2017-12-09 RX ORDER — CLINDAMYCIN HYDROCHLORIDE 150 MG/1
300 CAPSULE ORAL EVERY 8 HOURS SCHEDULED
Status: DISCONTINUED | OUTPATIENT
Start: 2017-12-09 | End: 2017-12-11

## 2017-12-09 RX ADMIN — DILTIAZEM HYDROCHLORIDE 360 MG: 180 CAPSULE, COATED, EXTENDED RELEASE ORAL at 08:48

## 2017-12-09 RX ADMIN — MORPHINE SULFATE 2 MG: 2 INJECTION, SOLUTION INTRAMUSCULAR; INTRAVENOUS at 20:53

## 2017-12-09 RX ADMIN — METOPROLOL TARTRATE 50 MG: 50 TABLET, FILM COATED ORAL at 20:01

## 2017-12-09 RX ADMIN — OXYCODONE HYDROCHLORIDE AND ACETAMINOPHEN 2 TABLET: 5; 325 TABLET ORAL at 20:01

## 2017-12-09 RX ADMIN — ANTACID TABLETS 500 MG: 500 TABLET, CHEWABLE ORAL at 17:49

## 2017-12-09 RX ADMIN — ASPIRIN 81 MG: 81 TABLET, COATED ORAL at 08:48

## 2017-12-09 RX ADMIN — INSULIN GLARGINE 75 UNITS: 100 INJECTION, SOLUTION SUBCUTANEOUS at 20:03

## 2017-12-09 RX ADMIN — METOPROLOL TARTRATE 50 MG: 50 TABLET, FILM COATED ORAL at 08:49

## 2017-12-09 RX ADMIN — INSULIN LISPRO 10 UNITS: 100 INJECTION, SOLUTION INTRAVENOUS; SUBCUTANEOUS at 08:14

## 2017-12-09 RX ADMIN — INSULIN LISPRO 20 UNITS: 100 INJECTION, SOLUTION INTRAVENOUS; SUBCUTANEOUS at 08:10

## 2017-12-09 RX ADMIN — INSULIN LISPRO 6 UNITS: 100 INJECTION, SOLUTION INTRAVENOUS; SUBCUTANEOUS at 12:19

## 2017-12-09 RX ADMIN — POTASSIUM CHLORIDE 20 MEQ: 20 TABLET, EXTENDED RELEASE ORAL at 08:49

## 2017-12-09 RX ADMIN — MORPHINE SULFATE 2 MG: 2 INJECTION, SOLUTION INTRAMUSCULAR; INTRAVENOUS at 15:38

## 2017-12-09 RX ADMIN — MAGNESIUM SULFATE HEPTAHYDRATE 1 G: 1 INJECTION, SOLUTION INTRAVENOUS at 15:02

## 2017-12-09 RX ADMIN — Medication 10 ML: at 20:02

## 2017-12-09 RX ADMIN — OXYCODONE HYDROCHLORIDE AND ACETAMINOPHEN 2 TABLET: 5; 325 TABLET ORAL at 03:36

## 2017-12-09 RX ADMIN — FUROSEMIDE 40 MG: 40 TABLET ORAL at 08:48

## 2017-12-09 RX ADMIN — FUROSEMIDE 40 MG: 40 TABLET ORAL at 20:02

## 2017-12-09 RX ADMIN — APIXABAN 5 MG: 5 TABLET, FILM COATED ORAL at 07:49

## 2017-12-09 RX ADMIN — MORPHINE SULFATE 2 MG: 2 INJECTION, SOLUTION INTRAMUSCULAR; INTRAVENOUS at 18:29

## 2017-12-09 RX ADMIN — MORPHINE SULFATE 2 MG: 2 INJECTION, SOLUTION INTRAMUSCULAR; INTRAVENOUS at 01:56

## 2017-12-09 RX ADMIN — INSULIN LISPRO 20 UNITS: 100 INJECTION, SOLUTION INTRAVENOUS; SUBCUTANEOUS at 12:12

## 2017-12-09 RX ADMIN — ANTACID TABLETS 500 MG: 500 TABLET, CHEWABLE ORAL at 15:27

## 2017-12-09 RX ADMIN — INSULIN LISPRO 3 UNITS: 100 INJECTION, SOLUTION INTRAVENOUS; SUBCUTANEOUS at 20:04

## 2017-12-09 RX ADMIN — MORPHINE SULFATE 2 MG: 2 INJECTION, SOLUTION INTRAMUSCULAR; INTRAVENOUS at 07:49

## 2017-12-09 RX ADMIN — APIXABAN 5 MG: 5 TABLET, FILM COATED ORAL at 20:01

## 2017-12-09 RX ADMIN — LOSARTAN POTASSIUM 100 MG: 50 TABLET, FILM COATED ORAL at 08:48

## 2017-12-09 RX ADMIN — MORPHINE SULFATE 2 MG: 2 INJECTION, SOLUTION INTRAMUSCULAR; INTRAVENOUS at 12:06

## 2017-12-09 RX ADMIN — INSULIN LISPRO 20 UNITS: 100 INJECTION, SOLUTION INTRAVENOUS; SUBCUTANEOUS at 18:48

## 2017-12-09 RX ADMIN — OXYCODONE HYDROCHLORIDE AND ACETAMINOPHEN 2 TABLET: 5; 325 TABLET ORAL at 09:01

## 2017-12-09 RX ADMIN — Medication 10 ML: at 09:03

## 2017-12-09 RX ADMIN — INSULIN LISPRO 8 UNITS: 100 INJECTION, SOLUTION INTRAVENOUS; SUBCUTANEOUS at 18:48

## 2017-12-09 RX ADMIN — INSULIN GLARGINE 75 UNITS: 100 INJECTION, SOLUTION SUBCUTANEOUS at 08:50

## 2017-12-09 RX ADMIN — POTASSIUM CHLORIDE 20 MEQ: 20 TABLET, EXTENDED RELEASE ORAL at 20:01

## 2017-12-09 RX ADMIN — METOLAZONE 2.5 MG: 2.5 TABLET ORAL at 08:50

## 2017-12-09 RX ADMIN — MORPHINE SULFATE 2 MG: 2 INJECTION, SOLUTION INTRAMUSCULAR; INTRAVENOUS at 04:58

## 2017-12-09 RX ADMIN — CLINDAMYCIN HYDROCHLORIDE 300 MG: 150 CAPSULE ORAL at 15:06

## 2017-12-09 RX ADMIN — SULFAMETHOXAZOLE AND TRIMETHOPRIM 1 TABLET: 800; 160 TABLET ORAL at 08:49

## 2017-12-09 RX ADMIN — MORPHINE SULFATE 2 MG: 2 INJECTION, SOLUTION INTRAMUSCULAR; INTRAVENOUS at 23:04

## 2017-12-09 ASSESSMENT — PAIN SCALES - GENERAL
PAINLEVEL_OUTOF10: 10
PAINLEVEL_OUTOF10: 7
PAINLEVEL_OUTOF10: 7
PAINLEVEL_OUTOF10: 8
PAINLEVEL_OUTOF10: 10
PAINLEVEL_OUTOF10: 8
PAINLEVEL_OUTOF10: 6
PAINLEVEL_OUTOF10: 9
PAINLEVEL_OUTOF10: 7
PAINLEVEL_OUTOF10: 8
PAINLEVEL_OUTOF10: 7

## 2017-12-09 ASSESSMENT — PAIN DESCRIPTION - LOCATION: LOCATION: SCROTUM

## 2017-12-09 ASSESSMENT — PAIN DESCRIPTION - PAIN TYPE: TYPE: ACUTE PAIN;SURGICAL PAIN

## 2017-12-09 ASSESSMENT — PAIN DESCRIPTION - ORIENTATION: ORIENTATION: RIGHT

## 2017-12-09 ASSESSMENT — PAIN DESCRIPTION - PROGRESSION: CLINICAL_PROGRESSION: NOT CHANGED

## 2017-12-09 NOTE — PLAN OF CARE
Problem: Pain:  Goal: Control of chronic pain  Control of chronic pain   Outcome: Ongoing      Problem: ABCDS Injury Assessment  Goal: Absence of physical injury  Outcome: Ongoing      Problem: Musculor/Skeletal Functional Status  Goal: Highest potential functional level  Outcome: Ongoing

## 2017-12-09 NOTE — PLAN OF CARE
Ely Martinez 18    Second Visit Note  For more detailed information please refer to the progress note of the day      12/9/2017    3:43 PM    Name:   Parker Toribio  MRN:     [id number]     Marie Musa:      [de-identified]   Room:   [facility]001-01   Day:  5  Admit Date:  12/4/2017  9:35 PM    PCP:   German Duron MD  Code Status:  Full Code    Pt vitals were reviewed   No new issues since my visit  Continued antibiotic use, wound care, therapy and rehab evaluation  Continued follow up      Bouchra Pizarro MD  12/9/2017  3:43 PM

## 2017-12-09 NOTE — PROGRESS NOTES
Problem Relation Age of Onset    Asthma Father     Cancer Father     Other Father     Early Death Paternal Grandfather        Vitals:  /68   Pulse 71   Temp 98.2 °F (36.8 °C) (Axillary)   Resp 13   Ht 6' 1\" (1.854 m)   Wt (!) 583 lb 9.6 oz (264.7 kg)   SpO2 96%   BMI 77.00 kg/m²   Temp (24hrs), Av.1 °F (36.7 °C), Min:97.7 °F (36.5 °C), Max:98.4 °F (36.9 °C)    Recent Labs      17   1117  17   1629  17   0736   POCGLU  320*  274*  305*  341*     I/O (24Hr): Intake/Output Summary (Last 24 hours) at 17 1247  Last data filed at 17 8851   Gross per 24 hour   Intake             2160 ml   Output             4300 ml   Net            -2140 ml     Labs:    Lab Results   Component Value Date/Time    SPECIAL NOT REPORTED 2017 03:26 PM     Lab Results   Component Value Date/Time    CULTURE NO GROWTH 2017 03:26 PM    CULTURE  2017 03:26 PM     [facility] [id number] [place] [address], [address] [place] [address] [phone]     Physical Examination:        General appearance:  alert, cooperative and no distress. Morbid obesity  Mental Status:  oriented to person, place and time and normal affect  Lungs:  clear to auscultation bilaterally, normal effort  Heart:  regular rate and rhythm, no murmur  Abdomen:  soft, nontender, nondistended, normal bowel sounds, no masses, hepatomegaly, splenomegaly  Extremities:  3 plus bilateral pedal edema  Skin:  Scrotal redness and swelling much improved    Assessment:        Primary Problem  Abscess, scrotum    Active Hospital Problems    Diagnosis Date Noted    Abscess, scrotum [N49.2] 2017     Priority: High    Morbid obesity with BMI of 70 and over, adult ([facility] [E66.01, Z68.45] 2017    Simple chronic bronchitis ([facility] [J41.0] 2017    Severe uncontrolled diabetes mellitus ([facility] [E11.65]  ETTA on CPAP at home 2017       Plan:        Principal Problem:    Abscess, scrotum: Status post drainage.

## 2017-12-09 NOTE — PLAN OF CARE
Problem: Respiratory  Intervention: Respiratory assessment  NON INVASIVE VENTILATION  PROVIDE OPTIMAL VENTILATION/ACCEPTABLE SP02  IMPLEMENT NON INVASIVE VENTILATION PROTOCOL  ASSESSMENT SKIN INTEGRITY  PATIENT EDUCATION AS NEEDED  BIPAP AS NEEDED

## 2017-12-10 LAB
ABSOLUTE EOS #: 1.78 K/UL (ref 0–0.44)
ABSOLUTE IMMATURE GRANULOCYTE: 0.24 K/UL (ref 0–0.3)
ABSOLUTE LYMPH #: 1.55 K/UL (ref 1.1–3.7)
ABSOLUTE MONO #: 1.13 K/UL (ref 0.1–1.2)
ANION GAP SERPL CALCULATED.3IONS-SCNC: 14 MMOL/L (ref 9–17)
BASOPHILS # BLD: 1 % (ref 0–2)
BASOPHILS ABSOLUTE: 0.07 K/UL (ref 0–0.2)
BUN BLDV-MCNC: 27 MG/DL (ref 6–20)
BUN/CREAT BLD: ABNORMAL (ref 9–20)
CALCIUM SERPL-MCNC: 8.3 MG/DL (ref 8.6–10.4)
CHLORIDE BLD-SCNC: 94 MMOL/L (ref 98–107)
CO2: 28 MMOL/L (ref 20–31)
CREAT SERPL-MCNC: 0.8 MG/DL (ref 0.7–1.2)
CULTURE: ABNORMAL
CULTURE: NORMAL
DIFFERENTIAL TYPE: ABNORMAL
DIRECT EXAM: ABNORMAL
DIRECT EXAM: ABNORMAL
EOSINOPHILS RELATIVE PERCENT: 15 % (ref 1–4)
GFR AFRICAN AMERICAN: >60 ML/MIN
GFR NON-AFRICAN AMERICAN: >60 ML/MIN
GFR SERPL CREATININE-BSD FRML MDRD: ABNORMAL ML/MIN/{1.73_M2}
GFR SERPL CREATININE-BSD FRML MDRD: ABNORMAL ML/MIN/{1.73_M2}
GLUCOSE BLD-MCNC: 182 MG/DL (ref 70–99)
GLUCOSE BLD-MCNC: 182 MG/DL (ref 75–110)
GLUCOSE BLD-MCNC: 184 MG/DL (ref 75–110)
GLUCOSE BLD-MCNC: 192 MG/DL (ref 75–110)
GLUCOSE BLD-MCNC: 203 MG/DL (ref 75–110)
HCT VFR BLD CALC: 34.1 % (ref 40.7–50.3)
HEMOGLOBIN: 11.1 G/DL (ref 13–17)
IMMATURE GRANULOCYTES: 2 %
LYMPHOCYTES # BLD: 13 % (ref 24–43)
Lab: ABNORMAL
Lab: NORMAL
Lab: NORMAL
MCH RBC QN AUTO: 29.6 PG (ref 25.2–33.5)
MCHC RBC AUTO-ENTMCNC: 32.6 G/DL (ref 28.4–34.8)
MCV RBC AUTO: 90.9 FL (ref 82.6–102.9)
MONOCYTES # BLD: 9 % (ref 3–12)
PDW BLD-RTO: 12.4 % (ref 11.8–14.4)
PLATELET # BLD: 326 K/UL (ref 138–453)
PLATELET ESTIMATE: ABNORMAL
PMV BLD AUTO: 9.7 FL (ref 8.1–13.5)
POTASSIUM SERPL-SCNC: 4.5 MMOL/L (ref 3.7–5.3)
RBC # BLD: 3.75 M/UL (ref 4.21–5.77)
RBC # BLD: ABNORMAL 10*6/UL
SEG NEUTROPHILS: 60 % (ref 36–65)
SEGMENTED NEUTROPHILS ABSOLUTE COUNT: 7.28 K/UL (ref 1.5–8.1)
SODIUM BLD-SCNC: 136 MMOL/L (ref 135–144)
SPECIMEN DESCRIPTION: ABNORMAL
SPECIMEN DESCRIPTION: NORMAL
SPECIMEN DESCRIPTION: NORMAL
STATUS: ABNORMAL
STATUS: NORMAL
STATUS: NORMAL
WBC # BLD: 12.1 K/UL (ref 3.5–11.3)
WBC # BLD: ABNORMAL 10*3/UL

## 2017-12-10 PROCEDURE — 2500000003 HC RX 250 WO HCPCS: Performed by: EMERGENCY MEDICINE

## 2017-12-10 PROCEDURE — 6370000000 HC RX 637 (ALT 250 FOR IP): Performed by: FAMILY MEDICINE

## 2017-12-10 PROCEDURE — 6370000000 HC RX 637 (ALT 250 FOR IP): Performed by: INTERNAL MEDICINE

## 2017-12-10 PROCEDURE — 80048 BASIC METABOLIC PNL TOTAL CA: CPT

## 2017-12-10 PROCEDURE — 99232 SBSQ HOSP IP/OBS MODERATE 35: CPT | Performed by: INTERNAL MEDICINE

## 2017-12-10 PROCEDURE — 36415 COLL VENOUS BLD VENIPUNCTURE: CPT

## 2017-12-10 PROCEDURE — 6370000000 HC RX 637 (ALT 250 FOR IP): Performed by: NURSE PRACTITIONER

## 2017-12-10 PROCEDURE — 85025 COMPLETE CBC W/AUTO DIFF WBC: CPT

## 2017-12-10 PROCEDURE — 2580000003 HC RX 258: Performed by: FAMILY MEDICINE

## 2017-12-10 PROCEDURE — 6360000002 HC RX W HCPCS: Performed by: NURSE PRACTITIONER

## 2017-12-10 PROCEDURE — 82947 ASSAY GLUCOSE BLOOD QUANT: CPT

## 2017-12-10 PROCEDURE — 2060000000 HC ICU INTERMEDIATE R&B

## 2017-12-10 PROCEDURE — 94762 N-INVAS EAR/PLS OXIMTRY CONT: CPT

## 2017-12-10 PROCEDURE — 0Y900ZZ DRAINAGE OF RIGHT BUTTOCK, OPEN APPROACH: ICD-10-PCS | Performed by: SURGERY

## 2017-12-10 RX ORDER — LIDOCAINE HYDROCHLORIDE 10 MG/ML
INJECTION, SOLUTION INFILTRATION; PERINEURAL
Status: DISPENSED
Start: 2017-12-10 | End: 2017-12-11

## 2017-12-10 RX ORDER — PANTOPRAZOLE SODIUM 40 MG/1
40 TABLET, DELAYED RELEASE ORAL
Status: DISCONTINUED | OUTPATIENT
Start: 2017-12-10 | End: 2017-12-13 | Stop reason: HOSPADM

## 2017-12-10 RX ORDER — LIDOCAINE HYDROCHLORIDE AND EPINEPHRINE 10; 10 MG/ML; UG/ML
20 INJECTION, SOLUTION INFILTRATION; PERINEURAL ONCE
Status: COMPLETED | OUTPATIENT
Start: 2017-12-10 | End: 2017-12-10

## 2017-12-10 RX ADMIN — MORPHINE SULFATE 4 MG: 4 INJECTION INTRAVENOUS at 07:59

## 2017-12-10 RX ADMIN — MORPHINE SULFATE 4 MG: 4 INJECTION INTRAVENOUS at 15:27

## 2017-12-10 RX ADMIN — OXYCODONE HYDROCHLORIDE AND ACETAMINOPHEN 2 TABLET: 5; 325 TABLET ORAL at 09:09

## 2017-12-10 RX ADMIN — LIDOCAINE HYDROCHLORIDE,EPINEPHRINE BITARTRATE 20 ML: 10; .01 INJECTION, SOLUTION INFILTRATION; PERINEURAL at 17:38

## 2017-12-10 RX ADMIN — METOLAZONE 2.5 MG: 2.5 TABLET ORAL at 08:03

## 2017-12-10 RX ADMIN — MORPHINE SULFATE 4 MG: 4 INJECTION INTRAVENOUS at 10:08

## 2017-12-10 RX ADMIN — MORPHINE SULFATE 4 MG: 4 INJECTION INTRAVENOUS at 03:29

## 2017-12-10 RX ADMIN — ASPIRIN 81 MG: 81 TABLET, COATED ORAL at 08:02

## 2017-12-10 RX ADMIN — OXYCODONE HYDROCHLORIDE AND ACETAMINOPHEN 2 TABLET: 5; 325 TABLET ORAL at 04:36

## 2017-12-10 RX ADMIN — ONDANSETRON 4 MG: 2 INJECTION INTRAMUSCULAR; INTRAVENOUS at 17:45

## 2017-12-10 RX ADMIN — APIXABAN 5 MG: 5 TABLET, FILM COATED ORAL at 08:02

## 2017-12-10 RX ADMIN — INSULIN LISPRO 20 UNITS: 100 INJECTION, SOLUTION INTRAVENOUS; SUBCUTANEOUS at 11:54

## 2017-12-10 RX ADMIN — APIXABAN 5 MG: 5 TABLET, FILM COATED ORAL at 20:35

## 2017-12-10 RX ADMIN — MORPHINE SULFATE 4 MG: 4 INJECTION INTRAVENOUS at 01:05

## 2017-12-10 RX ADMIN — DILTIAZEM HYDROCHLORIDE 360 MG: 180 CAPSULE, COATED, EXTENDED RELEASE ORAL at 08:01

## 2017-12-10 RX ADMIN — POTASSIUM CHLORIDE 20 MEQ: 20 TABLET, EXTENDED RELEASE ORAL at 20:34

## 2017-12-10 RX ADMIN — METOPROLOL TARTRATE 50 MG: 50 TABLET, FILM COATED ORAL at 20:35

## 2017-12-10 RX ADMIN — LOSARTAN POTASSIUM 100 MG: 50 TABLET, FILM COATED ORAL at 08:02

## 2017-12-10 RX ADMIN — FUROSEMIDE 40 MG: 40 TABLET ORAL at 08:02

## 2017-12-10 RX ADMIN — INSULIN LISPRO 20 UNITS: 100 INJECTION, SOLUTION INTRAVENOUS; SUBCUTANEOUS at 08:15

## 2017-12-10 RX ADMIN — MORPHINE SULFATE 4 MG: 4 INJECTION INTRAVENOUS at 12:12

## 2017-12-10 RX ADMIN — Medication 10 ML: at 20:35

## 2017-12-10 RX ADMIN — OXYCODONE HYDROCHLORIDE AND ACETAMINOPHEN 2 TABLET: 5; 325 TABLET ORAL at 16:34

## 2017-12-10 RX ADMIN — INSULIN LISPRO 2 UNITS: 100 INJECTION, SOLUTION INTRAVENOUS; SUBCUTANEOUS at 16:41

## 2017-12-10 RX ADMIN — INSULIN LISPRO 2 UNITS: 100 INJECTION, SOLUTION INTRAVENOUS; SUBCUTANEOUS at 08:06

## 2017-12-10 RX ADMIN — INSULIN LISPRO 4 UNITS: 100 INJECTION, SOLUTION INTRAVENOUS; SUBCUTANEOUS at 11:48

## 2017-12-10 RX ADMIN — ANTACID TABLETS 500 MG: 500 TABLET, CHEWABLE ORAL at 08:01

## 2017-12-10 RX ADMIN — MORPHINE SULFATE 4 MG: 4 INJECTION INTRAVENOUS at 17:32

## 2017-12-10 RX ADMIN — METOPROLOL TARTRATE 50 MG: 50 TABLET, FILM COATED ORAL at 08:02

## 2017-12-10 RX ADMIN — POTASSIUM CHLORIDE 20 MEQ: 20 TABLET, EXTENDED RELEASE ORAL at 08:03

## 2017-12-10 RX ADMIN — MORPHINE SULFATE 4 MG: 4 INJECTION INTRAVENOUS at 20:35

## 2017-12-10 RX ADMIN — Medication 10 ML: at 07:59

## 2017-12-10 RX ADMIN — INSULIN GLARGINE 75 UNITS: 100 INJECTION, SOLUTION SUBCUTANEOUS at 20:41

## 2017-12-10 RX ADMIN — INSULIN LISPRO 1 UNITS: 100 INJECTION, SOLUTION INTRAVENOUS; SUBCUTANEOUS at 20:41

## 2017-12-10 RX ADMIN — FUROSEMIDE 40 MG: 40 TABLET ORAL at 20:35

## 2017-12-10 RX ADMIN — MORPHINE SULFATE 2 MG: 2 INJECTION, SOLUTION INTRAMUSCULAR; INTRAVENOUS at 05:34

## 2017-12-10 RX ADMIN — INSULIN LISPRO 20 UNITS: 100 INJECTION, SOLUTION INTRAVENOUS; SUBCUTANEOUS at 16:49

## 2017-12-10 RX ADMIN — INSULIN GLARGINE 75 UNITS: 100 INJECTION, SOLUTION SUBCUTANEOUS at 08:01

## 2017-12-10 ASSESSMENT — PAIN SCALES - GENERAL
PAINLEVEL_OUTOF10: 10
PAINLEVEL_OUTOF10: 8
PAINLEVEL_OUTOF10: 10
PAINLEVEL_OUTOF10: 8
PAINLEVEL_OUTOF10: 10
PAINLEVEL_OUTOF10: 10
PAINLEVEL_OUTOF10: 9
PAINLEVEL_OUTOF10: 7
PAINLEVEL_OUTOF10: 8
PAINLEVEL_OUTOF10: 6
PAINLEVEL_OUTOF10: 10
PAINLEVEL_OUTOF10: 10

## 2017-12-10 NOTE — FLOWSHEET NOTE
Coccyx wound opened spontaneously with a moderate amt of bright red blood. Pressure dressing applied and  Surgery resident  Notified.

## 2017-12-10 NOTE — PLAN OF CARE
Problem: Respiratory  Intervention: Respiratory assessment  NONINVASIVE VENTILATION    PROVIDE OPTIMAL VENTILATION/ACCEPTABLE SPO2   IMPLEMENT NONINVASIVE VENTILATION PROTOCOL   MAINTAIN ACCEPTABLE SPO2   ASSESS SKIN INTEGRITY/BREAKDOWN SCORE   PATIENT EDUCATION AS NEEDED   BIPAP AS NEEDED

## 2017-12-10 NOTE — PROGRESS NOTES
Patient stable from  perspective, getting daily dressing changes. Per notes, going to [facility] for wound dressing and rehab. Urology will sign off. Okay with Cleocin TID for 1-2 weeks. Please have him follow up with [facility] in 1-2 weeks for further evaluation.

## 2017-12-10 NOTE — CONSULTS
History and Physical - General Surgery    PATIENT NAME: Lauren Guerra  AGE: 36 y.o. MEDICAL RECORD NO. [id number]  DATE: 12/10/2017  SURGEON: Dr. Julianne Renner: Gay Alonso MD    Patient evaluated at the request of  Dr. Jewel Bangura  Reason for evaluation: buttock abscesses    Patient information was obtained from patient. History/Exam limitations: none. Patient presented to the Emergency Department by private vehicle. IMPRESSION:     Patient Active Problem List   Diagnosis    Abscess, scrotum    Severe uncontrolled diabetes mellitus ([address]    Morbid obesity with BMI of 70 and over, adult ([address]    Simple chronic bronchitis ([address]       MEDICAL DECISION MAKING AND PLAN:     1. Chronic & recurrent buttock abscesses   - Will perform incision and drainage of abscess   - patient already on clindamycin for scrotal cellulitis   - General surgery to sign off after I&D is performed    HISTORY:   History of Chief Complaint:    Lauren Guerra is a 36 y.o. male who presents with recurrent and chronic buttock abscesses. Per patient this condition has been ongoing since his teenage years. Patient normally has his wife help him open these abscesses. He reports they are usually filled with blood clots, not pus. Patient's abscesses are located on his right buttock and appeared a few days ago. Patient reports that they itch and also hurt. Pain aggravated with palpation, alleviated with nothing. Pain does not radiate. Past Medical History   has a past medical history of Arthritis; Asthma; Blood circulation, collateral; COPD (chronic obstructive pulmonary disease) ([address] Former smoker; Hypertension; Morbid obesity ([address] ETTA (obstructive sleep apnea); PAF (paroxysmal atrial fibrillation) ([address] Pneumonia; and Uncontrolled diabetes mellitus with hyperglycemia ([address]      Past Surgical History   has a past surgical history that includes Leg Debridement (Right, 2012); other surgical history Provider, MD   ipratropium-albuterol (DUONEB) 0.5-2.5 (3) MG/3ML SOLN nebulizer solution Inhale 1 vial into the lungs 4 times daily   Yes Historical Provider, MD   apixaban (ELIQUIS) 5 MG TABS tablet Take 5 mg by mouth 2 times daily   Yes Historical Provider, MD   diltiazem (CARDIZEM CD) 240 MG extended release capsule Take 240 mg by mouth daily   Yes Historical Provider, MD    Scheduled Meds:   lidocaine-EPINEPHrine  20 mL Intradermal Once    clindamycin  300 mg Oral 3 times per day    diltiazem  360 mg Oral Daily    aspirin  81 mg Oral Daily    apixaban  5 mg Oral BID    metoprolol tartrate  50 mg Oral BID    insulin glargine  75 Units Subcutaneous BID    insulin lispro  20 Units Subcutaneous TID WC    insulin lispro  0-12 Units Subcutaneous TID WC    insulin lispro  0-6 Units Subcutaneous Nightly    furosemide  40 mg Oral BID    losartan  100 mg Oral Daily    metolazone  2.5 mg Oral Daily    potassium chloride  20 mEq Oral BID    sodium chloride flush  10 mL Intravenous 2 times per day     Continuous Infusions:   dextrose       PRN Meds:.magnesium sulfate, ondansetron, morphine **OR** morphine, potassium chloride **OR** potassium chloride **OR** potassium chloride, potassium chloride, oxyCODONE-acetaminophen **OR** oxyCODONE-acetaminophen, glucose, dextrose, glucagon (rDNA), dextrose, calcium carbonate, sodium chloride flush, acetaminophen    Allergies  is allergic to zosyn [piperacillin sod-tazobactam so]. Family History  family history includes Asthma in his father; Cancer in his father; Early Death in his paternal grandfather; Other in his father. Social History   reports that he quit smoking about 5 months ago. His smoking use included Cigarettes. He started smoking about 2 years ago. He smoked 1.00 pack per day. He has never used smokeless tobacco.   reports that he drinks alcohol. reports that he does not use drugs.     Review of Systems  Constitutional: negative for fevers or

## 2017-12-10 NOTE — PROGRESS NOTES
Ely Martinez 18    Progress Note    12/10/2017    10:16 AM    Name:   Anastasiya Schaeffer  MRN:     [id number]     Acct:      [de-identified]   Room:   3001/[facility] Day:  6  Admit Date:  12/4/2017  9:35 PM    PCP:   Estanislao Hamman, MD  Code Status:  Full Code    Subjective:     C/C: Scrotal swelling    Interval History Status: improved. Complains of new areas of possible abscesses in right and left buttock areas, had these multiple times in past, generally drains himself at home  No fevers reported   Serous drainage from testicular site    Brief History:     Anastasiya Schaeffer 36 y.o. male  is admitted to the hospital for the management of Scrotal abscess, hyperglycemia and uncontrolled type 2 diabetes mellitus, morbid obesity. Patient was transferred from [facility] emergency room where he presented with scrotal wound. Garima Waters has been having swelling in his scrotum for last few weeks.  Pain was constant and worse with sitting.  Patient denied any fever, chills.  He denied any discharge. Garima Waters does not have any dysuria, frequency, hematuria.   Patient is morbidly obese and has underlying type 2 diabetes mellitus which is uncontrolled.  Patient's initial evaluation over [facility] showed blood sugar more than 450 without ketoacidosis.  He was found to have scrotal abscess.  Due to his weight patient was transferred to [facility] for urological evaluation. Patient underwent I&D on 12/5/17 with cultures growing group B beta hemolytic streptococci. Initially treated with Imipenem that changed to clindamycin. Patient had 17 beat run of V. tach on 12/6/17. Atenolol was changed to metoprolol and dose was increased. Patient had paroxysmal atrial fibrillation. He is on long-term anticoagulation with Eliquis.      Review of Systems:     Constitutional:  negative for chills, fevers, sweats  Respiratory:  negative for cough, chronic exertional dyspnea  Cardiovascular: negative for chest pain, chest pressure/discomfort, chronic lower extremity edema that is not changed  Gastrointestinal:  negative for abdominal pain, constipation, diarrhea, nausea, vomiting  Neurological:  negative for dizziness, headache    Medications: Allergies: Allergies   Allergen Reactions    Zosyn [Piperacillin Sod-Tazobactam So] Itching and Other (See Comments)     Redness       Current Meds:   Scheduled Meds:    clindamycin  300 mg Oral 3 times per day    diltiazem  360 mg Oral Daily    aspirin  81 mg Oral Daily    apixaban  5 mg Oral BID    metoprolol tartrate  50 mg Oral BID    insulin glargine  75 Units Subcutaneous BID    insulin lispro  20 Units Subcutaneous TID WC    insulin lispro  0-12 Units Subcutaneous TID WC    insulin lispro  0-6 Units Subcutaneous Nightly    furosemide  40 mg Oral BID    losartan  100 mg Oral Daily    metolazone  2.5 mg Oral Daily    potassium chloride  20 mEq Oral BID    sodium chloride flush  10 mL Intravenous 2 times per day     Continuous Infusions:    dextrose       PRN Meds: magnesium sulfate, ondansetron, morphine **OR** morphine, potassium chloride **OR** potassium chloride **OR** potassium chloride, potassium chloride, oxyCODONE-acetaminophen **OR** oxyCODONE-acetaminophen, glucose, dextrose, glucagon (rDNA), dextrose, calcium carbonate, sodium chloride flush, acetaminophen    Data:     Past Medical History:   has a past medical history of Arthritis; Asthma; Blood circulation, collateral; COPD (chronic obstructive pulmonary disease) ([facility] Former smoker; Hypertension; Morbid obesity ([facility] ETTA (obstructive sleep apnea); PAF (paroxysmal atrial fibrillation) ([facility] Pneumonia; and Uncontrolled diabetes mellitus with hyperglycemia ([facility] Social History:   reports that he quit smoking about 5 months ago. His smoking use included Cigarettes. He started smoking about 2 years ago. He smoked 1.00 pack per day.  He has never used smokeless tobacco. He

## 2017-12-11 PROBLEM — J96.11 CHRONIC RESPIRATORY FAILURE WITH HYPOXIA, ON HOME OXYGEN THERAPY (HCC): Status: ACTIVE | Noted: 2017-12-11

## 2017-12-11 PROBLEM — L02.31 ABSCESS OF LEFT BUTTOCK: Status: ACTIVE | Noted: 2017-12-11

## 2017-12-11 PROBLEM — Z99.81 CHRONIC RESPIRATORY FAILURE WITH HYPOXIA, ON HOME OXYGEN THERAPY (HCC): Status: ACTIVE | Noted: 2017-12-11

## 2017-12-11 LAB
ABSOLUTE EOS #: 1.91 K/UL (ref 0–0.44)
ABSOLUTE IMMATURE GRANULOCYTE: 0.26 K/UL (ref 0–0.3)
ABSOLUTE LYMPH #: 1.83 K/UL (ref 1.1–3.7)
ABSOLUTE MONO #: 1.04 K/UL (ref 0.1–1.2)
ANION GAP SERPL CALCULATED.3IONS-SCNC: 11 MMOL/L (ref 9–17)
BASOPHILS # BLD: 1 % (ref 0–2)
BASOPHILS ABSOLUTE: 0.09 K/UL (ref 0–0.2)
BUN BLDV-MCNC: 29 MG/DL (ref 6–20)
BUN/CREAT BLD: ABNORMAL (ref 9–20)
CALCIUM SERPL-MCNC: 8.2 MG/DL (ref 8.6–10.4)
CHLORIDE BLD-SCNC: 96 MMOL/L (ref 98–107)
CO2: 29 MMOL/L (ref 20–31)
CREAT SERPL-MCNC: 0.94 MG/DL (ref 0.7–1.2)
DIFFERENTIAL TYPE: ABNORMAL
EOSINOPHILS RELATIVE PERCENT: 16 % (ref 1–4)
GFR AFRICAN AMERICAN: >60 ML/MIN
GFR NON-AFRICAN AMERICAN: >60 ML/MIN
GFR SERPL CREATININE-BSD FRML MDRD: ABNORMAL ML/MIN/{1.73_M2}
GFR SERPL CREATININE-BSD FRML MDRD: ABNORMAL ML/MIN/{1.73_M2}
GLUCOSE BLD-MCNC: 209 MG/DL (ref 70–99)
GLUCOSE BLD-MCNC: 210 MG/DL (ref 75–110)
GLUCOSE BLD-MCNC: 215 MG/DL (ref 75–110)
GLUCOSE BLD-MCNC: 221 MG/DL (ref 75–110)
GLUCOSE BLD-MCNC: 256 MG/DL (ref 75–110)
HCT VFR BLD CALC: 35.9 % (ref 40.7–50.3)
HEMOGLOBIN: 11.4 G/DL (ref 13–17)
IMMATURE GRANULOCYTES: 2 %
LYMPHOCYTES # BLD: 15 % (ref 24–43)
MCH RBC QN AUTO: 29.5 PG (ref 25.2–33.5)
MCHC RBC AUTO-ENTMCNC: 31.8 G/DL (ref 28.4–34.8)
MCV RBC AUTO: 93 FL (ref 82.6–102.9)
MONOCYTES # BLD: 9 % (ref 3–12)
PDW BLD-RTO: 12.5 % (ref 11.8–14.4)
PLATELET # BLD: 384 K/UL (ref 138–453)
PLATELET ESTIMATE: ABNORMAL
PMV BLD AUTO: 10 FL (ref 8.1–13.5)
POTASSIUM SERPL-SCNC: 5 MMOL/L (ref 3.7–5.3)
RBC # BLD: 3.86 M/UL (ref 4.21–5.77)
RBC # BLD: ABNORMAL 10*6/UL
SEG NEUTROPHILS: 57 % (ref 36–65)
SEGMENTED NEUTROPHILS ABSOLUTE COUNT: 6.77 K/UL (ref 1.5–8.1)
SODIUM BLD-SCNC: 136 MMOL/L (ref 135–144)
WBC # BLD: 11.9 K/UL (ref 3.5–11.3)
WBC # BLD: ABNORMAL 10*3/UL

## 2017-12-11 PROCEDURE — 82947 ASSAY GLUCOSE BLOOD QUANT: CPT

## 2017-12-11 PROCEDURE — 6370000000 HC RX 637 (ALT 250 FOR IP): Performed by: FAMILY MEDICINE

## 2017-12-11 PROCEDURE — 6370000000 HC RX 637 (ALT 250 FOR IP): Performed by: INTERNAL MEDICINE

## 2017-12-11 PROCEDURE — 94762 N-INVAS EAR/PLS OXIMTRY CONT: CPT

## 2017-12-11 PROCEDURE — 90715 TDAP VACCINE 7 YRS/> IM: CPT | Performed by: EMERGENCY MEDICINE

## 2017-12-11 PROCEDURE — 6360000002 HC RX W HCPCS: Performed by: NURSE PRACTITIONER

## 2017-12-11 PROCEDURE — 36415 COLL VENOUS BLD VENIPUNCTURE: CPT

## 2017-12-11 PROCEDURE — 2060000000 HC ICU INTERMEDIATE R&B

## 2017-12-11 PROCEDURE — 90471 IMMUNIZATION ADMIN: CPT | Performed by: EMERGENCY MEDICINE

## 2017-12-11 PROCEDURE — 99232 SBSQ HOSP IP/OBS MODERATE 35: CPT | Performed by: INTERNAL MEDICINE

## 2017-12-11 PROCEDURE — 6360000002 HC RX W HCPCS: Performed by: EMERGENCY MEDICINE

## 2017-12-11 PROCEDURE — 6370000000 HC RX 637 (ALT 250 FOR IP): Performed by: NURSE PRACTITIONER

## 2017-12-11 PROCEDURE — 80048 BASIC METABOLIC PNL TOTAL CA: CPT

## 2017-12-11 PROCEDURE — 97116 GAIT TRAINING THERAPY: CPT

## 2017-12-11 PROCEDURE — 2580000003 HC RX 258: Performed by: FAMILY MEDICINE

## 2017-12-11 PROCEDURE — 97110 THERAPEUTIC EXERCISES: CPT

## 2017-12-11 PROCEDURE — 85025 COMPLETE CBC W/AUTO DIFF WBC: CPT

## 2017-12-11 RX ORDER — OXYCODONE AND ACETAMINOPHEN 7.5; 325 MG/1; MG/1
1 TABLET ORAL EVERY 8 HOURS PRN
Qty: 20 TABLET | Refills: 0 | Status: SHIPPED | OUTPATIENT
Start: 2017-12-11 | End: 2022-09-22

## 2017-12-11 RX ORDER — CEPHALEXIN 500 MG/1
500 CAPSULE ORAL EVERY 6 HOURS SCHEDULED
Status: DISCONTINUED | OUTPATIENT
Start: 2017-12-11 | End: 2017-12-13 | Stop reason: HOSPADM

## 2017-12-11 RX ORDER — CEPHALEXIN 500 MG/1
500 CAPSULE ORAL 4 TIMES DAILY
Qty: 40 CAPSULE | Refills: 0 | Status: SHIPPED | OUTPATIENT
Start: 2017-12-11 | End: 2018-04-14 | Stop reason: ALTCHOICE

## 2017-12-11 RX ADMIN — INSULIN LISPRO 4 UNITS: 100 INJECTION, SOLUTION INTRAVENOUS; SUBCUTANEOUS at 08:40

## 2017-12-11 RX ADMIN — INSULIN LISPRO 2 UNITS: 100 INJECTION, SOLUTION INTRAVENOUS; SUBCUTANEOUS at 21:00

## 2017-12-11 RX ADMIN — METOPROLOL TARTRATE 50 MG: 50 TABLET, FILM COATED ORAL at 08:27

## 2017-12-11 RX ADMIN — FUROSEMIDE 40 MG: 40 TABLET ORAL at 20:53

## 2017-12-11 RX ADMIN — ANTACID TABLETS 500 MG: 500 TABLET, CHEWABLE ORAL at 21:05

## 2017-12-11 RX ADMIN — OXYCODONE HYDROCHLORIDE AND ACETAMINOPHEN 2 TABLET: 5; 325 TABLET ORAL at 14:53

## 2017-12-11 RX ADMIN — PANTOPRAZOLE SODIUM 40 MG: 40 TABLET, DELAYED RELEASE ORAL at 08:27

## 2017-12-11 RX ADMIN — INSULIN GLARGINE 75 UNITS: 100 INJECTION, SOLUTION SUBCUTANEOUS at 22:11

## 2017-12-11 RX ADMIN — INSULIN LISPRO 4 UNITS: 100 INJECTION, SOLUTION INTRAVENOUS; SUBCUTANEOUS at 18:48

## 2017-12-11 RX ADMIN — METOPROLOL TARTRATE 50 MG: 50 TABLET, FILM COATED ORAL at 20:53

## 2017-12-11 RX ADMIN — INSULIN LISPRO 20 UNITS: 100 INJECTION, SOLUTION INTRAVENOUS; SUBCUTANEOUS at 08:38

## 2017-12-11 RX ADMIN — METOLAZONE 2.5 MG: 2.5 TABLET ORAL at 08:27

## 2017-12-11 RX ADMIN — OXYCODONE HYDROCHLORIDE AND ACETAMINOPHEN 2 TABLET: 5; 325 TABLET ORAL at 23:23

## 2017-12-11 RX ADMIN — OXYCODONE HYDROCHLORIDE AND ACETAMINOPHEN 2 TABLET: 5; 325 TABLET ORAL at 10:38

## 2017-12-11 RX ADMIN — DILTIAZEM HYDROCHLORIDE 360 MG: 180 CAPSULE, COATED, EXTENDED RELEASE ORAL at 08:28

## 2017-12-11 RX ADMIN — MORPHINE SULFATE 4 MG: 4 INJECTION INTRAVENOUS at 12:12

## 2017-12-11 RX ADMIN — ASPIRIN 81 MG: 81 TABLET, COATED ORAL at 08:28

## 2017-12-11 RX ADMIN — INSULIN LISPRO 6 UNITS: 100 INJECTION, SOLUTION INTRAVENOUS; SUBCUTANEOUS at 12:01

## 2017-12-11 RX ADMIN — Medication 10 ML: at 20:53

## 2017-12-11 RX ADMIN — MORPHINE SULFATE 4 MG: 4 INJECTION INTRAVENOUS at 00:40

## 2017-12-11 RX ADMIN — OXYCODONE HYDROCHLORIDE AND ACETAMINOPHEN 2 TABLET: 5; 325 TABLET ORAL at 01:37

## 2017-12-11 RX ADMIN — Medication 10 ML: at 08:29

## 2017-12-11 RX ADMIN — MORPHINE SULFATE 2 MG: 2 INJECTION, SOLUTION INTRAMUSCULAR; INTRAVENOUS at 08:27

## 2017-12-11 RX ADMIN — POTASSIUM CHLORIDE 20 MEQ: 20 TABLET, EXTENDED RELEASE ORAL at 08:27

## 2017-12-11 RX ADMIN — LOSARTAN POTASSIUM 100 MG: 50 TABLET, FILM COATED ORAL at 08:27

## 2017-12-11 RX ADMIN — INSULIN LISPRO 20 UNITS: 100 INJECTION, SOLUTION INTRAVENOUS; SUBCUTANEOUS at 18:47

## 2017-12-11 RX ADMIN — APIXABAN 5 MG: 5 TABLET, FILM COATED ORAL at 08:28

## 2017-12-11 RX ADMIN — MORPHINE SULFATE 4 MG: 4 INJECTION INTRAVENOUS at 19:05

## 2017-12-11 RX ADMIN — TETANUS TOXOID, REDUCED DIPHTHERIA TOXOID AND ACELLULAR PERTUSSIS VACCINE, ADSORBED 0.5 ML: 5; 2.5; 8; 8; 2.5 SUSPENSION INTRAMUSCULAR at 14:53

## 2017-12-11 RX ADMIN — MORPHINE SULFATE 4 MG: 4 INJECTION INTRAVENOUS at 22:18

## 2017-12-11 RX ADMIN — CEPHALEXIN 500 MG: 500 CAPSULE ORAL at 18:47

## 2017-12-11 RX ADMIN — INSULIN GLARGINE 75 UNITS: 100 INJECTION, SOLUTION SUBCUTANEOUS at 08:37

## 2017-12-11 RX ADMIN — INSULIN LISPRO 20 UNITS: 100 INJECTION, SOLUTION INTRAVENOUS; SUBCUTANEOUS at 12:01

## 2017-12-11 RX ADMIN — CEPHALEXIN 500 MG: 500 CAPSULE ORAL at 12:02

## 2017-12-11 RX ADMIN — FUROSEMIDE 40 MG: 40 TABLET ORAL at 08:28

## 2017-12-11 RX ADMIN — APIXABAN 5 MG: 5 TABLET, FILM COATED ORAL at 20:53

## 2017-12-11 RX ADMIN — ONDANSETRON 4 MG: 2 INJECTION INTRAMUSCULAR; INTRAVENOUS at 00:44

## 2017-12-11 RX ADMIN — POTASSIUM CHLORIDE 20 MEQ: 20 TABLET, EXTENDED RELEASE ORAL at 20:53

## 2017-12-11 RX ADMIN — MORPHINE SULFATE 4 MG: 4 INJECTION INTRAVENOUS at 03:30

## 2017-12-11 ASSESSMENT — PAIN DESCRIPTION - PROGRESSION: CLINICAL_PROGRESSION: NOT CHANGED

## 2017-12-11 ASSESSMENT — PAIN SCALES - GENERAL
PAINLEVEL_OUTOF10: 8
PAINLEVEL_OUTOF10: 9
PAINLEVEL_OUTOF10: 9
PAINLEVEL_OUTOF10: 10
PAINLEVEL_OUTOF10: 8
PAINLEVEL_OUTOF10: 9
PAINLEVEL_OUTOF10: 9
PAINLEVEL_OUTOF10: 10
PAINLEVEL_OUTOF10: 9
PAINLEVEL_OUTOF10: 10
PAINLEVEL_OUTOF10: 8

## 2017-12-11 ASSESSMENT — PAIN DESCRIPTION - FREQUENCY: FREQUENCY: CONTINUOUS

## 2017-12-11 ASSESSMENT — PAIN DESCRIPTION - DESCRIPTORS: DESCRIPTORS: ACHING;DISCOMFORT

## 2017-12-11 ASSESSMENT — PAIN DESCRIPTION - ORIENTATION: ORIENTATION: LEFT;RIGHT;LOWER

## 2017-12-11 ASSESSMENT — PAIN DESCRIPTION - PAIN TYPE: TYPE: ACUTE PAIN

## 2017-12-11 ASSESSMENT — PAIN DESCRIPTION - ONSET: ONSET: ON-GOING

## 2017-12-11 NOTE — PROGRESS NOTES
Ely Martinez 18    Progress Note    12/11/2017    10:37 AM    Name:   Rissa Mason  MRN:     [id number]     Acct:      [de-identified]   Room:   3001/3001-  IP Day:  7  Admit Date:  12/4/2017  9:35 PM    PCP:   Kallie Jurado MD  Code Status:  Full Code    Subjective:     C/C: Scrotal swelling    Interval History Status: improved. Complains of more pain in testicular area today, also dressing fell off  Receiving morphine and percocet round th clock   Able to eat and drink well    Brief History:     Ladleeann Stable 36 y.o. male  is admitted to the hospital for the management of Scrotal abscess, hyperglycemia and uncontrolled type 2 diabetes mellitus, morbid obesity. Patient was transferred from [facility] emergency room where he presented with scrotal wound. Ally Hernandez has been having swelling in his scrotum for last few weeks.  Pain was constant and worse with sitting.  Patient denied any fever, chills.  He denied any discharge. Ally Hernandez does not have any dysuria, frequency, hematuria.   Patient is morbidly obese and has underlying type 2 diabetes mellitus which is uncontrolled.  Patient's initial evaluation over [facility] showed blood sugar more than 450 without ketoacidosis.  He was found to have scrotal abscess.  Due to his weight patient was transferred to [facility] for urological evaluation. Patient underwent I&D on 12/5/17 with cultures growing group B beta hemolytic streptococci. Initially treated with Imipenem that changed to clindamycin. Patient had 17 beat run of V. tach on 12/6/17. Atenolol was changed to metoprolol and dose was increased. Patient had paroxysmal atrial fibrillation. He is on long-term anticoagulation with Eliquis.       Review of Systems:     Constitutional:  negative for chills, fevers, sweats  Respiratory:  negative for cough, chronic exertional dyspnea  Cardiovascular:  negative for chest pain, chest pressure/discomfort, not use drugs. Family History:   Family History   Problem Relation Age of Onset    Asthma Father     Cancer Father     Other Father     Early Death Paternal Grandfather        Vitals:  BP (!) 144/76   Pulse 92   Temp 98.2 °F (36.8 °C) (Oral)   Resp 11   Ht 6' 1\" (1.854 m)   Wt (!) 583 lb 9.6 oz (264.7 kg)   SpO2 97%   BMI 77.00 kg/m²   Temp (24hrs), Av °F (36.7 °C), Min:97.8 °F (36.6 °C), Max:98.4 °F (36.9 °C)    Recent Labs      12/10/17   1120  12/10/17   1528  12/10/17   2027  17   0714   POCGLU  203*  192*  184*  210*     I/O (24Hr): Intake/Output Summary (Last 24 hours) at 17 1037  Last data filed at 17 0614   Gross per 24 hour   Intake             1300 ml   Output             1900 ml   Net             -600 ml     Labs:    Lab Results   Component Value Date/Time    SPECIAL NOT REPORTED 2017 03:26 PM     Lab Results   Component Value Date/Time    CULTURE NO GROWTH 2017 03:26 PM    CULTURE  2017 03:26 PM     [facility] [id number] [place] [address] [phone]     Physical Examination:        General appearance:  alert, cooperative and no distress. Morbid obesity  Mental Status:  oriented to person, place and time and normal affect  Lungs:  clear to auscultation bilaterally, normal effort  Heart:  regular rate and rhythm, no murmur  Abdomen:  soft, nontender, nondistended, normal bowel sounds, testicular exam with large open wound in right side of testicle with some drainage noted.  Improved edema overall though  Extremities:  3 plus bilateral pedal edema  Skin: as above    Assessment:        Primary Problem  Abscess, scrotum    Active Hospital Problems    Diagnosis Date Noted    Abscess, scrotum [N49.2] 2017     Priority: High    Morbid obesity with BMI of 70 and over, adult ([facility] [E66.01, Z68.45] 2017    Simple chronic bronchitis ([facility] [J41.0] 2017    Severe uncontrolled diabetes mellitus ([facility] [E11.65]  ETTA on CPAP at home 12/04/2017       Plan:        Principal Problem:    Abscess, scrotum: Status post drainage. Growing group B beta hemolytic strep. Cephalexin for total of 10 days    Buttock area abscesses: Multiple, recurrent. Cultures once drained. Recurrent     Severe uncontrolled diabetes mellitus ([address] on lantus and sliding scale along with pre meal boluses    Morbid obesity with BMI of 70 and over, adult ([address]    Simple chronic bronchitis ([address]): Oxygen PRN    ETTA;  On CPAP treatment at home        Froy Engel MD  12/11/2017  10:37 AM

## 2017-12-11 NOTE — PROGRESS NOTES
Message sent via [employer] to Dr. Vaibhav Beasley r/t clarification on dressing changes for I & D of abscesses completed on the R buttock on 12/10 and to be completed on the [address] prior to discharge to facility   No orders received at this time   Will continue to monitor

## 2017-12-11 NOTE — PROGRESS NOTES
Physical Therapy  Facility/Department: [facility]  Daily Treatment Note  NAME: Shannan Gaston  : 1975  MRN: [id number]    Date of Service: 2017    Patient Diagnosis(es):   Patient Active Problem List    Diagnosis Date Noted    Abscess, scrotum 2017     Priority: High    Chronic respiratory failure with hypoxia, on home oxygen therapy ([address] 2017    Abscess of left buttock 2017    Morbid obesity with BMI of 70 and over, adult ([address] 2017    Simple chronic bronchitis ([address] 2017    Severe uncontrolled diabetes mellitus ([address] 2017       Past Medical History:   Diagnosis Date    Arthritis     of the knees    Asthma     Blood circulation, collateral     COPD (chronic obstructive pulmonary disease) ([address]     Former smoker     Hypertension     Morbid obesity ([address]     ETTA (obstructive sleep apnea)     PAF (paroxysmal atrial fibrillation) ([address]     Pneumonia     Uncontrolled diabetes mellitus with hyperglycemia ([address]      Past Surgical History:   Procedure Laterality Date    INCISION AND DRAINAGE N/A 2017    SCROTAL INCISION AND DRAINAGE, DIFFICULT PRAJAPATI INSERTION performed by Megha Palma MD at [address] [place] [address] Right 2012    Wound debridement to R Leg wound    OTHER SURGICAL HISTORY  2017    I & D scrotum       Restrictions  Restrictions/Precautions  Restrictions/Precautions: General Precautions  Required Braces or Orthoses?: No  Position Activity Restriction  Other position/activity restrictions: Activity as tolerated  Subjective   General  Chart Reviewed: Yes  Response To Previous Treatment: Patient reporting fatigue but able to participate. Subjective  Subjective: Pt and RN agreeable to PT. Pt supine in bed upon arrival, with bipap. Pt c/o pain in scrotum, bilat knees and back.   Pain Screening  Patient Currently in Pain: Yes  Pain Assessment  Pain Assessment: 0-10  Pain Level: 9  Pain Type: Acute pain  Pain Location: Scrotum;Knee;Back  Pain Orientation: Left;Right; Lower  Pain Descriptors: Aching;Discomfort  Pain Frequency: Continuous  Pain Onset: On-going  Clinical Progression: Not changed  Pain Intervention(s): Ambulation/Increased activity; Emotional support;Distraction  Response to Pain Intervention: Patient Satisfied  Vital Signs  Patient Currently in Pain: Yes       Orientation     Objective   Bed mobility  Rolling to Left: Contact guard assistance  Supine to Sit: Contact guard assistance  Sit to Supine: Unable to assess (pt left in chair after amb)  Scooting: Contact guard assistance  Transfers  Sit to Stand: Contact guard assistance  Stand to sit: Contact guard assistance  Ambulation  Ambulation?: Yes  Ambulation 1  Surface: level tile  Other Apparatus:  (pt insisted on amb on room air)  Assistance: Contact guard assistance  Quality of Gait: slow gait, large lateral sways d/t body habitus, limited by pain  Distance: 75'  Comments: SaO2 WNL during amb on RA  Stairs/Curb  Stairs?: No     Balance  Posture: Good  Sitting - Static: Good  Sitting - Dynamic: Good  Standing - Static: Fair  Standing - Dynamic: Fair  Comments: standing balance assessed without AD  Exercises  Seated LE exercise program: Long Arc Quads, hip abduction (no adduction d/t pain in scrotum), heel/toe raises, and marches. Reps: 15x   Comments:         Assessment   Body structures, Functions, Activity limitations: Decreased functional mobility   Assessment: Pt demonstrated limited endurance with amb ambulation due to pain. Home alone throughout the day, would be limited inability to care for himself. Pt would benefit from additional acute care and post-acute care therapy services prior to a safe discharge home. Prognosis: Good  Patient Education: importance of amb, use of IS  REQUIRES PT FOLLOW UP: Yes  Activity Tolerance  Activity Tolerance: Patient limited by pain; Patient limited by endurance  Activity Tolerance: improved tolerance to amb Goals  Short term goals  Time Frame for Short term goals: 6 visits  Short term goal 1: Pt modified indep with bed mobility  Short term goal 2: Pt modified indep with transfers  Short term goal 3: Pt amb 100 ft without device, supervision  Short term goal 4: Pt negotiate 4 stairs with 2 rails and CGA  Patient Goals   Patient goals : To heal    Plan    Plan  Times per week: 5-6x/week  Current Treatment Recommendations: Strengthening, Balance Training, Functional Mobility Training, Stair training, Gait Training, Transfer Training, Endurance Training  Safety Devices  Type of devices:  All fall risk precautions in place, Call light within reach, Left in chair, Nurse notified (pt refused to wear gait belt)  Restraints  Initially in place: No     Therapy Time   Individual Concurrent Group Co-treatment   Time In 1301         Time Out 1328         Minutes [address] [place], [place]

## 2017-12-12 LAB
ABSOLUTE EOS #: 1.99 K/UL (ref 0–0.44)
ABSOLUTE IMMATURE GRANULOCYTE: 0.23 K/UL (ref 0–0.3)
ABSOLUTE LYMPH #: 1.76 K/UL (ref 1.1–3.7)
ABSOLUTE MONO #: 0.9 K/UL (ref 0.1–1.2)
ANION GAP SERPL CALCULATED.3IONS-SCNC: 9 MMOL/L (ref 9–17)
BASOPHILS # BLD: 1 % (ref 0–2)
BASOPHILS ABSOLUTE: 0.11 K/UL (ref 0–0.2)
BUN BLDV-MCNC: 31 MG/DL (ref 6–20)
BUN/CREAT BLD: ABNORMAL (ref 9–20)
CALCIUM SERPL-MCNC: 8.3 MG/DL (ref 8.6–10.4)
CHLORIDE BLD-SCNC: 92 MMOL/L (ref 98–107)
CO2: 30 MMOL/L (ref 20–31)
CREAT SERPL-MCNC: 0.88 MG/DL (ref 0.7–1.2)
DIFFERENTIAL TYPE: ABNORMAL
EOSINOPHILS RELATIVE PERCENT: 17 % (ref 1–4)
GFR AFRICAN AMERICAN: >60 ML/MIN
GFR NON-AFRICAN AMERICAN: >60 ML/MIN
GFR SERPL CREATININE-BSD FRML MDRD: ABNORMAL ML/MIN/{1.73_M2}
GFR SERPL CREATININE-BSD FRML MDRD: ABNORMAL ML/MIN/{1.73_M2}
GLUCOSE BLD-MCNC: 181 MG/DL (ref 75–110)
GLUCOSE BLD-MCNC: 207 MG/DL (ref 75–110)
GLUCOSE BLD-MCNC: 226 MG/DL (ref 75–110)
GLUCOSE BLD-MCNC: 231 MG/DL (ref 70–99)
GLUCOSE BLD-MCNC: 291 MG/DL (ref 75–110)
HCT VFR BLD CALC: 35.2 % (ref 40.7–50.3)
HEMOGLOBIN: 11 G/DL (ref 13–17)
IMMATURE GRANULOCYTES: 2 %
LYMPHOCYTES # BLD: 15 % (ref 24–43)
MCH RBC QN AUTO: 29.3 PG (ref 25.2–33.5)
MCHC RBC AUTO-ENTMCNC: 31.3 G/DL (ref 28.4–34.8)
MCV RBC AUTO: 93.6 FL (ref 82.6–102.9)
MONOCYTES # BLD: 8 % (ref 3–12)
PDW BLD-RTO: 12.1 % (ref 11.8–14.4)
PLATELET # BLD: 302 K/UL (ref 138–453)
PLATELET ESTIMATE: ABNORMAL
PMV BLD AUTO: 9.4 FL (ref 8.1–13.5)
POTASSIUM SERPL-SCNC: 4.6 MMOL/L (ref 3.7–5.3)
RBC # BLD: 3.76 M/UL (ref 4.21–5.77)
RBC # BLD: ABNORMAL 10*6/UL
SEG NEUTROPHILS: 57 % (ref 36–65)
SEGMENTED NEUTROPHILS ABSOLUTE COUNT: 6.58 K/UL (ref 1.5–8.1)
SODIUM BLD-SCNC: 131 MMOL/L (ref 135–144)
WBC # BLD: 11.6 K/UL (ref 3.5–11.3)
WBC # BLD: ABNORMAL 10*3/UL

## 2017-12-12 PROCEDURE — 36415 COLL VENOUS BLD VENIPUNCTURE: CPT

## 2017-12-12 PROCEDURE — 6370000000 HC RX 637 (ALT 250 FOR IP): Performed by: INTERNAL MEDICINE

## 2017-12-12 PROCEDURE — 6370000000 HC RX 637 (ALT 250 FOR IP): Performed by: NURSE PRACTITIONER

## 2017-12-12 PROCEDURE — 97116 GAIT TRAINING THERAPY: CPT

## 2017-12-12 PROCEDURE — 99232 SBSQ HOSP IP/OBS MODERATE 35: CPT | Performed by: INTERNAL MEDICINE

## 2017-12-12 PROCEDURE — 2580000003 HC RX 258: Performed by: FAMILY MEDICINE

## 2017-12-12 PROCEDURE — 6370000000 HC RX 637 (ALT 250 FOR IP): Performed by: FAMILY MEDICINE

## 2017-12-12 PROCEDURE — 6360000002 HC RX W HCPCS: Performed by: NURSE PRACTITIONER

## 2017-12-12 PROCEDURE — 2060000000 HC ICU INTERMEDIATE R&B

## 2017-12-12 PROCEDURE — 85025 COMPLETE CBC W/AUTO DIFF WBC: CPT

## 2017-12-12 PROCEDURE — 82947 ASSAY GLUCOSE BLOOD QUANT: CPT

## 2017-12-12 PROCEDURE — 94660 CPAP INITIATION&MGMT: CPT

## 2017-12-12 PROCEDURE — 94762 N-INVAS EAR/PLS OXIMTRY CONT: CPT

## 2017-12-12 PROCEDURE — 99211 OFF/OP EST MAY X REQ PHY/QHP: CPT

## 2017-12-12 PROCEDURE — 80048 BASIC METABOLIC PNL TOTAL CA: CPT

## 2017-12-12 RX ADMIN — CEPHALEXIN 500 MG: 500 CAPSULE ORAL at 10:33

## 2017-12-12 RX ADMIN — Medication 10 ML: at 10:31

## 2017-12-12 RX ADMIN — PANTOPRAZOLE SODIUM 40 MG: 40 TABLET, DELAYED RELEASE ORAL at 10:34

## 2017-12-12 RX ADMIN — OXYCODONE HYDROCHLORIDE AND ACETAMINOPHEN 2 TABLET: 5; 325 TABLET ORAL at 04:38

## 2017-12-12 RX ADMIN — APIXABAN 5 MG: 5 TABLET, FILM COATED ORAL at 10:36

## 2017-12-12 RX ADMIN — OXYCODONE HYDROCHLORIDE AND ACETAMINOPHEN 2 TABLET: 5; 325 TABLET ORAL at 17:11

## 2017-12-12 RX ADMIN — CEPHALEXIN 500 MG: 500 CAPSULE ORAL at 18:50

## 2017-12-12 RX ADMIN — POTASSIUM CHLORIDE 20 MEQ: 20 TABLET, EXTENDED RELEASE ORAL at 20:52

## 2017-12-12 RX ADMIN — INSULIN LISPRO 2 UNITS: 100 INJECTION, SOLUTION INTRAVENOUS; SUBCUTANEOUS at 17:10

## 2017-12-12 RX ADMIN — ANTACID TABLETS 500 MG: 500 TABLET, CHEWABLE ORAL at 20:52

## 2017-12-12 RX ADMIN — MORPHINE SULFATE 2 MG: 2 INJECTION, SOLUTION INTRAMUSCULAR; INTRAVENOUS at 05:58

## 2017-12-12 RX ADMIN — MORPHINE SULFATE 2 MG: 2 INJECTION, SOLUTION INTRAMUSCULAR; INTRAVENOUS at 00:59

## 2017-12-12 RX ADMIN — OXYCODONE HYDROCHLORIDE AND ACETAMINOPHEN 2 TABLET: 5; 325 TABLET ORAL at 11:47

## 2017-12-12 RX ADMIN — INSULIN LISPRO 2 UNITS: 100 INJECTION, SOLUTION INTRAVENOUS; SUBCUTANEOUS at 20:54

## 2017-12-12 RX ADMIN — INSULIN LISPRO 4 UNITS: 100 INJECTION, SOLUTION INTRAVENOUS; SUBCUTANEOUS at 10:37

## 2017-12-12 RX ADMIN — MORPHINE SULFATE 2 MG: 2 INJECTION, SOLUTION INTRAMUSCULAR; INTRAVENOUS at 19:14

## 2017-12-12 RX ADMIN — CEPHALEXIN 500 MG: 500 CAPSULE ORAL at 05:59

## 2017-12-12 RX ADMIN — INSULIN GLARGINE 75 UNITS: 100 INJECTION, SOLUTION SUBCUTANEOUS at 10:24

## 2017-12-12 RX ADMIN — INSULIN LISPRO 20 UNITS: 100 INJECTION, SOLUTION INTRAVENOUS; SUBCUTANEOUS at 12:20

## 2017-12-12 RX ADMIN — ASPIRIN 81 MG: 81 TABLET, COATED ORAL at 10:34

## 2017-12-12 RX ADMIN — FUROSEMIDE 40 MG: 40 TABLET ORAL at 10:35

## 2017-12-12 RX ADMIN — METOPROLOL TARTRATE 50 MG: 50 TABLET, FILM COATED ORAL at 20:52

## 2017-12-12 RX ADMIN — LOSARTAN POTASSIUM 100 MG: 50 TABLET, FILM COATED ORAL at 10:34

## 2017-12-12 RX ADMIN — FUROSEMIDE 40 MG: 40 TABLET ORAL at 20:52

## 2017-12-12 RX ADMIN — MORPHINE SULFATE 4 MG: 4 INJECTION INTRAVENOUS at 23:12

## 2017-12-12 RX ADMIN — APIXABAN 5 MG: 5 TABLET, FILM COATED ORAL at 20:52

## 2017-12-12 RX ADMIN — INSULIN LISPRO 20 UNITS: 100 INJECTION, SOLUTION INTRAVENOUS; SUBCUTANEOUS at 10:43

## 2017-12-12 RX ADMIN — INSULIN GLARGINE 75 UNITS: 100 INJECTION, SOLUTION SUBCUTANEOUS at 20:54

## 2017-12-12 RX ADMIN — OXYCODONE HYDROCHLORIDE AND ACETAMINOPHEN 2 TABLET: 5; 325 TABLET ORAL at 21:17

## 2017-12-12 RX ADMIN — CEPHALEXIN 500 MG: 500 CAPSULE ORAL at 00:59

## 2017-12-12 RX ADMIN — METOLAZONE 2.5 MG: 2.5 TABLET ORAL at 10:33

## 2017-12-12 RX ADMIN — MORPHINE SULFATE 2 MG: 2 INJECTION, SOLUTION INTRAMUSCULAR; INTRAVENOUS at 15:56

## 2017-12-12 RX ADMIN — INSULIN LISPRO 20 UNITS: 100 INJECTION, SOLUTION INTRAVENOUS; SUBCUTANEOUS at 18:49

## 2017-12-12 RX ADMIN — CEPHALEXIN 500 MG: 500 CAPSULE ORAL at 23:12

## 2017-12-12 RX ADMIN — DILTIAZEM HYDROCHLORIDE 360 MG: 180 CAPSULE, COATED, EXTENDED RELEASE ORAL at 10:33

## 2017-12-12 RX ADMIN — Medication 10 ML: at 20:59

## 2017-12-12 RX ADMIN — MORPHINE SULFATE 2 MG: 2 INJECTION, SOLUTION INTRAMUSCULAR; INTRAVENOUS at 10:31

## 2017-12-12 RX ADMIN — POTASSIUM CHLORIDE 20 MEQ: 20 TABLET, EXTENDED RELEASE ORAL at 11:47

## 2017-12-12 RX ADMIN — METOPROLOL TARTRATE 50 MG: 50 TABLET, FILM COATED ORAL at 10:33

## 2017-12-12 RX ADMIN — INSULIN LISPRO 6 UNITS: 100 INJECTION, SOLUTION INTRAVENOUS; SUBCUTANEOUS at 12:17

## 2017-12-12 ASSESSMENT — PAIN SCALES - GENERAL
PAINLEVEL_OUTOF10: 7
PAINLEVEL_OUTOF10: 9
PAINLEVEL_OUTOF10: 8
PAINLEVEL_OUTOF10: 7
PAINLEVEL_OUTOF10: 8
PAINLEVEL_OUTOF10: 8
PAINLEVEL_OUTOF10: 9
PAINLEVEL_OUTOF10: 9
PAINLEVEL_OUTOF10: 8
PAINLEVEL_OUTOF10: 7
PAINLEVEL_OUTOF10: 8

## 2017-12-12 ASSESSMENT — PAIN DESCRIPTION - FREQUENCY: FREQUENCY: CONTINUOUS

## 2017-12-12 ASSESSMENT — PAIN DESCRIPTION - LOCATION: LOCATION: SCROTUM

## 2017-12-12 ASSESSMENT — PAIN DESCRIPTION - DESCRIPTORS: DESCRIPTORS: ACHING

## 2017-12-12 ASSESSMENT — PAIN DESCRIPTION - PAIN TYPE: TYPE: ACUTE PAIN

## 2017-12-12 NOTE — PROGRESS NOTES
Physical Therapy  Facility/Department: [facility] CAR 3  Daily Treatment Note  NAME: Phill Gardner  : 1975  MRN: [id number]    Date of Service: 2017    Patient Diagnosis(es):   Patient Active Problem List    Diagnosis Date Noted    Abscess, scrotum 2017     Priority: High    Chronic respiratory failure with hypoxia, on home oxygen therapy ([address] 2017    Abscess of left buttock 2017    Morbid obesity with BMI of 70 and over, adult ([address] 2017    Simple chronic bronchitis ([address] 2017    Severe uncontrolled diabetes mellitus ([address] 2017       Past Medical History:   Diagnosis Date    Arthritis     of the knees    Asthma     Blood circulation, collateral     COPD (chronic obstructive pulmonary disease) ([address]     Former smoker     Hypertension     Morbid obesity ([address]     ETTA (obstructive sleep apnea)     PAF (paroxysmal atrial fibrillation) ([address]     Pneumonia     Uncontrolled diabetes mellitus with hyperglycemia ([address]      Past Surgical History:   Procedure Laterality Date    INCISION AND DRAINAGE N/A 2017    SCROTAL INCISION AND DRAINAGE, DIFFICULT PRAJAPATI INSERTION performed by Magalys Pierce MD at [address] [place] [address] Right 2012    Wound debridement to R Leg wound    OTHER SURGICAL HISTORY  2017    I & D scrotum       Restrictions  Restrictions/Precautions  Restrictions/Precautions: General Precautions  Required Braces or Orthoses?: No  Position Activity Restriction  Other position/activity restrictions: Activity as tolerated  Subjective   General  Chart Reviewed: Yes  Response To Previous Treatment: Patient with no complaints from previous session. Family / Caregiver Present: No  Subjective  Subjective: Pt reporting 9/10 pain; RN administered Morphine prior to ambulation. General Comment  Comments: Pt returned to chair at end of session with BLEs elevated. Pt instructed to complete ankle pumps t/o the day for DVT prevention and edema mgmt. Balance Training, Functional Mobility Training, Stair training, Gait Training, Transfer Training, Endurance Training  Safety Devices  Type of devices:  All fall risk precautions in place, Gait belt  Restraints  Initially in place: No     Therapy Time   Individual Concurrent Group Co-treatment   Time In 0351         Time Out 0407         Minutes [address], [place]

## 2017-12-12 NOTE — PROGRESS NOTES
[facility] [address] [facility]    Progress Note    12/12/2017    1:28 PM    Name:   Leah Perez  MRN:     [id number]     Acct:      [de-identified]   Room:   [address] [place] [address]  IP Day:  8  Admit Date:  12/4/2017  9:35 PM    PCP:   Wyatt Alexander MD  Code Status:  Full Code    Subjective:     C/C: Scrotal swelling    Interval History Status: improved. Worried about going home if needs to go home today  Also worried about continued swelling on testicle area  No other new issues reported    Brief History:     Leah Perez 36 y.o. male  is admitted to the hospital for the management of Scrotal abscess, hyperglycemia and uncontrolled type 2 diabetes mellitus, morbid obesity. Patient was transferred from [facility] emergency room where he presented with scrotal wound. Janeen Cesar has been having swelling in his scrotum for last few weeks.  Pain was constant and worse with sitting.  Patient denied any fever, chills.  He denied any discharge. Janeen Cesar does not have any dysuria, frequency, hematuria.   Patient is morbidly obese and has underlying type 2 diabetes mellitus which is uncontrolled.  Patient's initial evaluation over [facility] showed blood sugar more than 450 without ketoacidosis.  He was found to have scrotal abscess.  Due to his weight patient was transferred to [facility] for urological evaluation. Patient underwent I&D on 12/5/17 with cultures growing group B beta hemolytic streptococci. Initially treated with Imipenem that changed to clindamycin. Patient had 17 beat run of V. tach on 12/6/17. Atenolol was changed to metoprolol and dose was increased. Patient had paroxysmal atrial fibrillation. He is on long-term anticoagulation with Eliquis.       Review of Systems:     Constitutional:  negative for chills, fevers, sweats  Respiratory:  negative for cough, chronic exertional dyspnea  Cardiovascular:  negative for chest pain, chest pressure/discomfort, chronic lower extremity edema that is not changed  Gastrointestinal:  negative for abdominal pain, constipation, diarrhea, nausea, vomiting  Neurological:  negative for dizziness, headache    Medications: Allergies:    No Known Allergies    Current Meds:   Scheduled Meds:    cephALEXin  500 mg Oral 4 times per day    pantoprazole  40 mg Oral QAM AC    diltiazem  360 mg Oral Daily    aspirin  81 mg Oral Daily    apixaban  5 mg Oral BID    metoprolol tartrate  50 mg Oral BID    insulin glargine  75 Units Subcutaneous BID    insulin lispro  20 Units Subcutaneous TID WC    insulin lispro  0-12 Units Subcutaneous TID WC    insulin lispro  0-6 Units Subcutaneous Nightly    furosemide  40 mg Oral BID    losartan  100 mg Oral Daily    metolazone  2.5 mg Oral Daily    potassium chloride  20 mEq Oral BID    sodium chloride flush  10 mL Intravenous 2 times per day     Continuous Infusions:    dextrose       PRN Meds: magnesium sulfate, ondansetron, morphine **OR** morphine, potassium chloride **OR** potassium chloride **OR** potassium chloride, potassium chloride, oxyCODONE-acetaminophen **OR** oxyCODONE-acetaminophen, glucose, dextrose, glucagon (rDNA), dextrose, calcium carbonate, sodium chloride flush, acetaminophen    Data:     Past Medical History:   has a past medical history of Arthritis; Asthma; Blood circulation, collateral; COPD (chronic obstructive pulmonary disease) ([facility] Former smoker; Hypertension; Morbid obesity ([facility] ETTA (obstructive sleep apnea); PAF (paroxysmal atrial fibrillation) ([facility] Pneumonia; and Uncontrolled diabetes mellitus with hyperglycemia ([facility] Social History:   reports that he quit smoking about 5 months ago. His smoking use included Cigarettes. He started smoking about 2 years ago. He smoked 1.00 pack per day. He has never used smokeless tobacco. He reports that he drinks alcohol. He reports that he does not use drugs.      Family History:   Family History   Problem Relation Age of post drainage noted 12/04/2017       Plan:        Principal Problem:    Abscess, scrotum: Status post drainage. Growing group B beta hemolytic strep. Cephalexin for total of 10 days. Continued to improve swelling    Buttock area abscesses: Multiple, recurrent    Severe uncontrolled diabetes mellitus ([address] on lantus and sliding scale along with pre meal boluses    Morbid obesity with BMI of 70 and over, adult ([address]    Simple chronic bronchitis ([address]): Oxygen PRN    ETTA;  On CPAP treatment at home    Possible discharge today      Bouchra Pizarro MD  12/12/2017  1:28 PM

## 2017-12-12 NOTE — PLAN OF CARE
Problem: Falls - Risk of  Goal: Absence of falls  Outcome: Met This Shift  Free of injury  Continue to monitor   Cont plan of care

## 2017-12-12 NOTE — PLAN OF CARE
Problem: Pain:  Goal: Pain level will decrease  Pain level will decrease   Outcome: Ongoing    Goal: Control of acute pain  Control of acute pain   Outcome: Ongoing      Problem: Falls - Risk of  Goal: Absence of falls  Outcome: Ongoing  Call light and beside table within reach, bed in lowest position, patient educated about using call light before getting up, patient alert and oriented, put nonskid socks on, path cleared to bathroom

## 2017-12-12 NOTE — PROGRESS NOTES
[address] [address] Continence Nurse  Consult Note       NAME:  Eneida Ross  MEDICAL RECORD NUMBER:  [id number]  AGE: 36 y.o. GENDER: male  : 1975  TODAY'S DATE:  2017    Subjective:     Reason for WOCN Evaluation and Assessment: scrotal abscess s/p I&d wound dressing change, 3 puncture wounds to right buttock.        Petey Snyder is a 36 y. o. male referred by:   [x] Physician  [] Nursing  [] Other:      Wound Identification:  Wound Type: abscess  Contributing Factors: diabetes, poor glucose control, chronic pressure, decreased mobility, shear force and obesity        PAST MEDICAL HISTORY        Diagnosis Date    Arthritis     of the knees    Asthma     Blood circulation, collateral     [facility]) [facility]     Former smoker     Hypertension     Morbid obesity ([facility]     ETTA (obstructive sleep apnea)     PAF (paroxysmal atrial fibrillation) ([facility]     Pneumonia     Uncontrolled diabetes mellitus with hyperglycemia ([facility]        PAST SURGICAL HISTORY    Past Surgical History:   Procedure Laterality Date    INCISION AND DRAINAGE N/A 2017    SCROTAL INCISION AND DRAINAGE, DIFFICULT PRAJAPATI INSERTION performed by Brett Hill MD at [address] [place] [address] Right 2012    Wound debridement to R Leg wound    OTHER SURGICAL HISTORY  2017    I & D scrotum       FAMILY HISTORY    Family History   Problem Relation Age of Onset    Asthma Father     Cancer Father     Other Father     Early Death Paternal Grandfather        SOCIAL HISTORY    Social History   Substance Use Topics    Smoking status: Former Smoker     Packs/day: 1.00     Types: Cigarettes     Start date: 3/25/2015     Quit date: 2017    Smokeless tobacco: Never Used      Comment: cut down to 1/2 ppd 17    Alcohol use Yes      Comment: 1-2 times per year       ALLERGIES    No Known Allergies        Objective:      BP (!) 104/45   Pulse 74   Temp 98.6 °F (37 °C) (Oral)   Resp 12 Ht 6' 1\" (1.854 m)   Wt (!) 583 lb 9.6 oz (264.7 kg)   SpO2 96%   BMI 77.00 kg/m²   Prakash Risk Score: Prakash Scale Score: 19    LABS    CBC:   Lab Results   Component Value Date    WBC 11.6 12/12/2017    RBC 3.76 12/12/2017    HGB 11.0 12/12/2017     CMP:  Albumin:    Lab Results   Component Value Date    LABALBU 2.9 12/09/2017     PT/INR:    Lab Results   Component Value Date    PROTIME 11.8 12/05/2017    INR 1.1 12/05/2017     HgBA1c:    Lab Results   Component Value Date    LABA1C 12.3 12/04/2017     PTT: No components found for: LABPTT      Assessment:     Patient Active Problem List   Diagnosis    Abscess, scrotum    Severe uncontrolled diabetes mellitus [facility]    Morbid obesity with BMI of 70 and over, adult ([facility]    Simple chronic bronchitis ([facility])    Chronic respiratory failure with hypoxia, on home oxygen therapy ([facility]    Abscess of left buttock       Measurements:     12/12/17 1053   Incision 12/05/17 Scrotum   Date First Assessed/Time First Assessed: 12/05/17 1428   Location: Scrotum   Wound Length (cm) 4 cm   Wound Width (cm) 3 cm   Wound Depth (cm)  2   Closure None   Drainage Amount Small   Drainage Description Sanguinous   Odor None   Dressing/Treatment ABD; Alginate;Silver dressing   Dressing Changed Changed/New   Dressing Change Due 12/13/17   Incision 12/11/17 Buttocks Posterior 3 puncture wounds   Date First Assessed/Time First Assessed: 12/11/17 1200   Location: Buttocks  Wound Location Orientation: Posterior   Wound Assessment Clean;Dry   Talisha-wound Assessment Other (Comment)  (ecchymosis)   Closure None   Drainage Amount Scant   Drainage Description Sanguinous   Odor None   Dressing/Treatment ABD   Dressing Changed Changed/New   Dressing Change Due 12/13/17       Response to treatment:  Well tolerated by patient. Plan:     Plan of Care:   Irrigate wounds with NS  Cover buttock wounds with ABD. Change daily  Gently pack scrotal wound with Aquacel Ag Extra, cover with ABD.  Change daily.    Specialty Bed Required : Yes   [x] Low Air Loss   [x] Pressure Redistribution  [] Fluid Immersion  [x] Bariatric  [] Total Pressure Relief  [] Other:     Discharge Plan:  Placement for patient upon discharge: skilled nursing   Hospice Care: No   Patient appropriate for Outpatient [address]: Yes: if scrotal wound does not heal in a timely manner, may follow up with a wound care center.     Patient/Caregiver Teaching:    [] Indicates understanding       [] Needs reinforcement  [] Unsuccessful      [x] Verbal Understanding  [] Demonstrated understanding       [] No evidence of learning  [] Refused teaching         [] N/A       Electronically signed by Cade Little RN, CWON on 12/12/2017 at 10:55 AM

## 2017-12-12 NOTE — PLAN OF CARE
Problem: Pain:  Goal: Control of chronic pain  Control of chronic pain   Outcome: Ongoing      Problem: IP BALANCE  Goal: BALANCE EDUCATION  Educate patients on maintaining dynamic/static standing/sitting balance, with/without upper extremity support.    Outcome: Ongoing

## 2017-12-13 VITALS
DIASTOLIC BLOOD PRESSURE: 87 MMHG | WEIGHT: 315 LBS | TEMPERATURE: 97.7 F | OXYGEN SATURATION: 95 % | HEIGHT: 73 IN | RESPIRATION RATE: 15 BRPM | HEART RATE: 93 BPM | SYSTOLIC BLOOD PRESSURE: 104 MMHG | BODY MASS INDEX: 41.75 KG/M2

## 2017-12-13 LAB
ABSOLUTE EOS #: 1.86 K/UL (ref 0–0.44)
ABSOLUTE IMMATURE GRANULOCYTE: 0.18 K/UL (ref 0–0.3)
ABSOLUTE LYMPH #: 1.62 K/UL (ref 1.1–3.7)
ABSOLUTE MONO #: 0.75 K/UL (ref 0.1–1.2)
ANION GAP SERPL CALCULATED.3IONS-SCNC: 10 MMOL/L (ref 9–17)
BASOPHILS # BLD: 1 % (ref 0–2)
BASOPHILS ABSOLUTE: 0.08 K/UL (ref 0–0.2)
BUN BLDV-MCNC: 29 MG/DL (ref 6–20)
BUN/CREAT BLD: ABNORMAL (ref 9–20)
CALCIUM SERPL-MCNC: 8.3 MG/DL (ref 8.6–10.4)
CHLORIDE BLD-SCNC: 99 MMOL/L (ref 98–107)
CO2: 31 MMOL/L (ref 20–31)
CREAT SERPL-MCNC: 0.87 MG/DL (ref 0.7–1.2)
DIFFERENTIAL TYPE: ABNORMAL
EOSINOPHILS RELATIVE PERCENT: 19 % (ref 1–4)
GFR AFRICAN AMERICAN: >60 ML/MIN
GFR NON-AFRICAN AMERICAN: >60 ML/MIN
GFR SERPL CREATININE-BSD FRML MDRD: ABNORMAL ML/MIN/{1.73_M2}
GFR SERPL CREATININE-BSD FRML MDRD: ABNORMAL ML/MIN/{1.73_M2}
GLUCOSE BLD-MCNC: 173 MG/DL (ref 75–110)
GLUCOSE BLD-MCNC: 190 MG/DL (ref 70–99)
GLUCOSE BLD-MCNC: 236 MG/DL (ref 75–110)
HCT VFR BLD CALC: 34.2 % (ref 40.7–50.3)
HEMOGLOBIN: 10.8 G/DL (ref 13–17)
IMMATURE GRANULOCYTES: 2 %
LYMPHOCYTES # BLD: 17 % (ref 24–43)
MCH RBC QN AUTO: 29 PG (ref 25.2–33.5)
MCHC RBC AUTO-ENTMCNC: 31.6 G/DL (ref 28.4–34.8)
MCV RBC AUTO: 91.9 FL (ref 82.6–102.9)
MONOCYTES # BLD: 8 % (ref 3–12)
PDW BLD-RTO: 12.2 % (ref 11.8–14.4)
PLATELET # BLD: 315 K/UL (ref 138–453)
PLATELET ESTIMATE: ABNORMAL
PMV BLD AUTO: 9.2 FL (ref 8.1–13.5)
POTASSIUM SERPL-SCNC: 4.4 MMOL/L (ref 3.7–5.3)
RBC # BLD: 3.72 M/UL (ref 4.21–5.77)
RBC # BLD: ABNORMAL 10*6/UL
SEG NEUTROPHILS: 53 % (ref 36–65)
SEGMENTED NEUTROPHILS ABSOLUTE COUNT: 5.23 K/UL (ref 1.5–8.1)
SODIUM BLD-SCNC: 140 MMOL/L (ref 135–144)
WBC # BLD: 9.7 K/UL (ref 3.5–11.3)
WBC # BLD: ABNORMAL 10*3/UL

## 2017-12-13 PROCEDURE — 94762 N-INVAS EAR/PLS OXIMTRY CONT: CPT

## 2017-12-13 PROCEDURE — 36415 COLL VENOUS BLD VENIPUNCTURE: CPT

## 2017-12-13 PROCEDURE — 85025 COMPLETE CBC W/AUTO DIFF WBC: CPT

## 2017-12-13 PROCEDURE — 6360000002 HC RX W HCPCS: Performed by: NURSE PRACTITIONER

## 2017-12-13 PROCEDURE — 6370000000 HC RX 637 (ALT 250 FOR IP): Performed by: FAMILY MEDICINE

## 2017-12-13 PROCEDURE — 99239 HOSP IP/OBS DSCHRG MGMT >30: CPT | Performed by: INTERNAL MEDICINE

## 2017-12-13 PROCEDURE — 2580000003 HC RX 258: Performed by: FAMILY MEDICINE

## 2017-12-13 PROCEDURE — 6370000000 HC RX 637 (ALT 250 FOR IP): Performed by: INTERNAL MEDICINE

## 2017-12-13 PROCEDURE — 82947 ASSAY GLUCOSE BLOOD QUANT: CPT

## 2017-12-13 PROCEDURE — 94660 CPAP INITIATION&MGMT: CPT

## 2017-12-13 PROCEDURE — 6370000000 HC RX 637 (ALT 250 FOR IP): Performed by: NURSE PRACTITIONER

## 2017-12-13 PROCEDURE — 80048 BASIC METABOLIC PNL TOTAL CA: CPT

## 2017-12-13 RX ADMIN — ANTACID TABLETS 500 MG: 500 TABLET, CHEWABLE ORAL at 09:50

## 2017-12-13 RX ADMIN — Medication 10 ML: at 09:59

## 2017-12-13 RX ADMIN — CEPHALEXIN 500 MG: 500 CAPSULE ORAL at 06:24

## 2017-12-13 RX ADMIN — METOLAZONE 2.5 MG: 2.5 TABLET ORAL at 09:49

## 2017-12-13 RX ADMIN — CEPHALEXIN 500 MG: 500 CAPSULE ORAL at 13:19

## 2017-12-13 RX ADMIN — FUROSEMIDE 40 MG: 40 TABLET ORAL at 09:49

## 2017-12-13 RX ADMIN — OXYCODONE HYDROCHLORIDE AND ACETAMINOPHEN 2 TABLET: 5; 325 TABLET ORAL at 12:24

## 2017-12-13 RX ADMIN — MORPHINE SULFATE 4 MG: 4 INJECTION INTRAVENOUS at 09:45

## 2017-12-13 RX ADMIN — INSULIN GLARGINE 75 UNITS: 100 INJECTION, SOLUTION SUBCUTANEOUS at 09:53

## 2017-12-13 RX ADMIN — APIXABAN 5 MG: 5 TABLET, FILM COATED ORAL at 09:49

## 2017-12-13 RX ADMIN — DILTIAZEM HYDROCHLORIDE 360 MG: 180 CAPSULE, COATED, EXTENDED RELEASE ORAL at 09:50

## 2017-12-13 RX ADMIN — INSULIN LISPRO 4 UNITS: 100 INJECTION, SOLUTION INTRAVENOUS; SUBCUTANEOUS at 13:21

## 2017-12-13 RX ADMIN — METOPROLOL TARTRATE 50 MG: 50 TABLET, FILM COATED ORAL at 09:49

## 2017-12-13 RX ADMIN — ASPIRIN 81 MG: 81 TABLET, COATED ORAL at 09:57

## 2017-12-13 RX ADMIN — INSULIN LISPRO 2 UNITS: 100 INJECTION, SOLUTION INTRAVENOUS; SUBCUTANEOUS at 09:55

## 2017-12-13 RX ADMIN — OXYCODONE HYDROCHLORIDE AND ACETAMINOPHEN 2 TABLET: 5; 325 TABLET ORAL at 04:02

## 2017-12-13 RX ADMIN — LOSARTAN POTASSIUM 100 MG: 50 TABLET, FILM COATED ORAL at 09:57

## 2017-12-13 RX ADMIN — INSULIN LISPRO 20 UNITS: 100 INJECTION, SOLUTION INTRAVENOUS; SUBCUTANEOUS at 13:20

## 2017-12-13 RX ADMIN — MORPHINE SULFATE 4 MG: 4 INJECTION INTRAVENOUS at 02:15

## 2017-12-13 RX ADMIN — INSULIN LISPRO 20 UNITS: 100 INJECTION, SOLUTION INTRAVENOUS; SUBCUTANEOUS at 09:53

## 2017-12-13 RX ADMIN — PANTOPRAZOLE SODIUM 40 MG: 40 TABLET, DELAYED RELEASE ORAL at 09:58

## 2017-12-13 RX ADMIN — POTASSIUM CHLORIDE 20 MEQ: 20 TABLET, EXTENDED RELEASE ORAL at 09:58

## 2017-12-13 RX ADMIN — MORPHINE SULFATE 4 MG: 4 INJECTION INTRAVENOUS at 15:19

## 2017-12-13 ASSESSMENT — PAIN SCALES - GENERAL
PAINLEVEL_OUTOF10: 9
PAINLEVEL_OUTOF10: 8

## 2017-12-13 NOTE — CARE COORDINATION
Called [facility] to f/u on precert spoke to luis f no word yet will call.    14:28 f/u with luis f on precert still awaiting decision
Faxed updated notes to [facility] for insurance review, they have received and will pass on to insurance.
Received vm from [facility] they have precert awaiting bed to open anticipating later today. Placed call this morning spoke to admissions ok to set up transport with [facility]. Faxed hens and updated PT notes.    Met with patient to discuss
Spoke with patient about discharge plan. Have not received precert from insurance co yet. Asked him what his plan would be if they do not approve SNF. He would probably have to go home with home care or wife doing dressings. He states he cannot have home care because of his dogs and he does not think wife can do dressing changes. Asked him to try to think about what he will do if he can't go to SNF.
Spoke with patient about discharge plan. He will need daily scrotal dressing changes. Due to body habitus, he will not be able to do them himself. Asked him if he would like home care. He states he lives in a mobile home with wife, they have three pit bulls who he states are very protective of him and he would be nervous about having someone in the home. Asked if his wife could be taught to change dressing, he again states he would be nervous about the dogs sensing that she might be hurting him. He is asking for SNF   Provided list for him to consider, he spoke with wife and friend. [facility], second choice is [facility].    Faxed referral to [facility]
9:29 AM

## 2017-12-13 NOTE — DISCHARGE SUMMARY
beta hemolytic streptococci. Initially treated with Imipenem that changed to cephalexin. Patient had 17 beat run of V. tach on 12/6/17. Atenolol was changed to metoprolol and dose was increased. Patient had paroxysmal atrial fibrillation. He is on long-term anticoagulation with Eliquis. Also has multiple small abscesses in buttock area needing drainage. Significant therapeutic interventions: Scrotal abscess incision and drainage    Significant Diagnostic Studies:     No results found. Consultations:    Consults:     Final Specialist Recommendations/Findings:   IP CONSULT TO DIETITIAN  PHARMACY TO DOSE VANCOMYCIN  IP CONSULT TO UROLOGY  IP CONSULT TO IV TEAM  IP CONSULT TO SOCIAL WORK  IP CONSULT TO IV TEAM  IP CONSULT TO CARDIOLOGY  IP CONSULT TO CASE MANAGEMENT  IP CONSULT TO GENERAL SURGERY  IP CONSULT TO IV TEAM      The patient was seen and examined on day of discharge and this discharge summary is in conjunction with any daily progress note from day of discharge.     Discharge plan:     Disposition: Skilled Facility    Physician Follow Up:   Randye Kehr, MD  [address] [place] [id number] [phone]          [facility] UROLOGY  [address] [place] [id number]    Follow up in 1-2 weeks for evaluation of healing     Requiring Further Evaluation/Follow Up POST HOSPITALIZATION/Incidental Findings: Dressing changes    Diet: cardiac diet    Activity: As tolerated    Instructions to Patient: Weight loss    Discharge Medications:      Medication List      START taking these medications    aspirin 81 MG EC tablet  Take 1 tablet by mouth daily     cephALEXin 500 MG capsule  Commonly known as:  KEFLEX  Take 1 capsule by mouth 4 times daily     metoprolol 100 MG tablet  Commonly known as:  LOPRESSOR  Take 1 tablet by mouth 2 times daily        CHANGE how you take these medications    insulin glargine 100 UNIT/ML injection vial  Commonly known as:  LANTUS  Inject 80 Units into the skin 2 times daily  What changed:  how much to take     insulin lispro 100 UNIT/ML injection vial  Commonly known as:  HUMALOG  Inject 20 Units into the skin 3 times daily (with meals)  What changed:  · how much to take  · when to take this        CONTINUE taking these medications    * albuterol sulfate  (90 Base) MCG/ACT inhaler     * albuterol (2.5 MG/3ML) 0.083% nebulizer solution  Commonly known as:  PROVENTIL     cetirizine-psuedoephedrine 5-120 MG per extended release tablet  Commonly known as:  ZYRTEC-D     diltiazem 240 MG extended release capsule  Commonly known as:  CARDIZEM CD     ELIQUIS 5 MG Tabs tablet  Generic drug:  apixaban     furosemide 40 MG tablet  Commonly known as:  LASIX     Glucosamine-Chondroitin--300-250 MG Tabs     ipratropium-albuterol 0.5-2.5 (3) MG/3ML Soln nebulizer solution  Commonly known as:  DUONEB     losartan 100 MG tablet  Commonly known as:  COZAAR     metolazone 2.5 MG tablet  Commonly known as:  ZAROXOLYN     oxyCODONE-acetaminophen 7.5-325 MG per tablet  Commonly known as:  PERCOCET  Take 1 tablet by mouth every 8 hours as needed for Pain .     potassium chloride 20 MEQ extended release tablet  Commonly known as:  KLOR-CON M        * This list has 2 medication(s) that are the same as other medications prescribed for you. Read the directions carefully, and ask your doctor or other care provider to review them with you.             STOP taking these medications    atenolol 50 MG tablet  Commonly known as:  TENORMIN     HYDROcodone-acetaminophen 5-325 MG per tablet  Commonly known as:  NORCO     insulin lispro protamine & lispro (75-25) 100 UNIT per ML Susp injection vial  Commonly known as:  HUMALOG MIX           Where to Get Your Medications      These medications were sent to [facility] [address] [place] [address] [address] [facility]    Phone:  [phone]   · aspirin 81 MG EC tablet  · cephALEXin 500 MG capsule  · insulin glargine 100 UNIT/ML injection vial  · insulin lispro 100 UNIT/ML injection vial  · metoprolol 100 MG tablet     You can get these medications from any pharmacy    Bring a paper prescription for each of these medications  · oxyCODONE-acetaminophen 7.5-325 MG per tablet         Time Spent on discharge is  35 mins in patient examination, evaluation, counseling as well as medication reconciliation, prescriptions for required medications, discharge plan and follow up. Electronically signed by   Luc Munoz MD  12/13/2017  4:06 PM      Thank you Dr. Keith Osullivan MD for the opportunity to be involved in this patient's care.

## 2017-12-13 NOTE — PROGRESS NOTES
[facility]    Progress Note    12/13/2017    4:03 PM    Name:   Stacey Arriola  MRN:     [id number]     Acct:      [de-identified]   Room:   [facility] Day:  9  Admit Date:  12/4/2017  9:35 PM    PCP:   Audra Langford MD  Code Status:  Full Code    Subjective:     C/C: Scrotal swelling    Interval History Status: improved  No new issues overnight  Seems getting better    Brief History:     Stacey Arriola 36 y.o. male  is admitted to the hospital for the management of Scrotal abscess, hyperglycemia and uncontrolled type 2 diabetes mellitus, morbid obesity. Patient was transferred from [facility] emergency room where he presented with scrotal wound. Evon Garcia has been having swelling in his scrotum for last few weeks.  Pain was constant and worse with sitting.  Patient denied any fever, chills.  He denied any discharge. Evon Garcia does not have any dysuria, frequency, hematuria.   Patient is morbidly obese and has underlying type 2 diabetes mellitus which is uncontrolled.  Patient's initial evaluation over [facility] showed blood sugar more than 450 without ketoacidosis.  He was found to have scrotal abscess.  Due to his weight patient was transferred to [facility] for urological evaluation. Patient underwent I&D on 12/5/17 with cultures growing group B beta hemolytic streptococci. Initially treated with Imipenem that changed to cephalexin. Patient had 17 beat run of V. tach on 12/6/17. Atenolol was changed to metoprolol and dose was increased. Patient had paroxysmal atrial fibrillation. He is on long-term anticoagulation with Eliquis. Also has multiple small abscesses in buttock area needing drainage.      Review of Systems:     Constitutional:  negative for chills, fevers, sweats  Respiratory:  negative for cough, chronic exertional dyspnea  Cardiovascular:  negative for chest pain, chest pressure/discomfort, chronic lower extremity edema that is not changed  Gastrointestinal:  negative for abdominal pain, constipation, diarrhea, nausea, vomiting  Neurological:  negative for dizziness, headache    Medications: Allergies:    No Known Allergies    Current Meds:   Scheduled Meds:    cephALEXin  500 mg Oral 4 times per day    pantoprazole  40 mg Oral QAM AC    diltiazem  360 mg Oral Daily    aspirin  81 mg Oral Daily    apixaban  5 mg Oral BID    metoprolol tartrate  50 mg Oral BID    insulin glargine  75 Units Subcutaneous BID    insulin lispro  20 Units Subcutaneous TID WC    insulin lispro  0-12 Units Subcutaneous TID WC    insulin lispro  0-6 Units Subcutaneous Nightly    furosemide  40 mg Oral BID    losartan  100 mg Oral Daily    metolazone  2.5 mg Oral Daily    potassium chloride  20 mEq Oral BID    sodium chloride flush  10 mL Intravenous 2 times per day     Continuous Infusions:    dextrose       PRN Meds: magnesium sulfate, ondansetron, morphine **OR** morphine, potassium chloride **OR** potassium chloride **OR** potassium chloride, potassium chloride, oxyCODONE-acetaminophen **OR** oxyCODONE-acetaminophen, glucose, dextrose, glucagon (rDNA), dextrose, calcium carbonate, sodium chloride flush, acetaminophen    Data:     Past Medical History:   has a past medical history of Arthritis; Asthma; Blood circulation, collateral; COPD (chronic obstructive [facility] disease) ([facility] Former smoker; Hypertension; Morbid obesity ([facility] ETTA (obstructive sleep apnea); PAF (paroxysmal atrial fibrillation) ([facility] Pneumonia; and Uncontrolled diabetes mellitus with hyperglycemia ([facility] Social History:   reports that he quit smoking about 5 months ago. His smoking use included Cigarettes. He started smoking about 2 years ago. He smoked 1.00 pack per day. He has never used smokeless tobacco. He reports that he drinks alcohol. He reports that he does not use drugs.      Family History:   Family History   Problem Relation Age of Onset    Asthma Father    Susana Willis 12/04/2017       Plan:        Principal Problem:    Abscess, scrotum: Status post drainage. Growing group B beta hemolytic strep. Cephalexin for total of 10 days. Continued to improve swelling as expected. Scrotal elevation would help    Buttock area abscesses: Multiple, recurrent    Severe uncontrolled diabetes mellitus ([address] on lantus and sliding scale along with pre meal boluses    Morbid obesity with BMI of 70 and over, adult ([address]    Simple chronic bronchitis ([address]): Oxygen PRN    ETTA;  On CPAP treatment at home    Gina Hammer for discharge today      Anya Bosch MD  12/13/2017  4:03 PM

## 2017-12-13 NOTE — PLAN OF CARE
Problem: Pain:  Goal: Pain level will decrease  Pain level will decrease   Outcome: Ongoing      Problem: Falls - Risk of  Goal: Absence of falls  Outcome: Ongoing      Comments: Electronically signed by Marceline Dandy, RN on 12/12/2017 at 10:19 PM

## 2017-12-27 NOTE — H&P
This note is copy of the note filed on 12/5/17 at 1:06 PM listed as progress in error . No edition or deletions done . Ely Martinez 19     HISTORY AND PHYSICAL EXAMINATION            Date:                            12/5/2017  Patient name:              Mata Sanford  Date of admission:      12/4/2017  9:35 PM  MRN:                           [id number]  Account:                      [id number]  YOB: 1975  PCP:                            Rehana Yuan MD  Room:                          [address] [place] [address]  Code Status:               Full Code     Chief Complaint:      Uncontrolled type 2 diabetes mellitus with hyperglycemia  Scrotal wound     History Obtained From:      patient, electronic medical record     History of Present Illness:      The patient is a 36 y.o. Non-/non  male who presents with Scrotal pain, swelling and he is admitted to the hospital for the management of Scrotal abscess, hyperglycemia and uncontrolled type 2 diabetes mellitus, morbid obesity. Patient was transferred from [facility] emergency room where he presented with scrotal wound. He has been having swelling in his scrotum for last few weeks. Pain was constant and worse with sitting. Patient denied any fever, chills. He denied any discharge. He does not have any dysuria, frequency, hematuria. Patient is morbidly obese and has underlying type 2 diabetes mellitus which is uncontrolled. Patient's initial evaluation over [facility] showed blood sugar more than 450 without ketoacidosis. He was found to have scrotal abscess.   Due to his weight patient was transferred to [facility] for urological evaluation.            Past Medical History:      Past Medical History        Past Medical History:   Diagnosis Date    Arthritis       of the knees    Asthma      Blood circulation, collateral      COPD [facility]chronic obstructive pulmonary disease) ([facility]      Hypertension      Morbid obesity ([facility]      Pneumonia      Uncontrolled diabetes mellitus with hyperglycemia ([facility]              Past Surgical History:      Past Surgical History         Past Surgical History:   Procedure Laterality Date    LEG DEBRIDEMENT Right 2012     Wound debridement to R Leg wound            Medications Prior to Admission:      Home Medications           Prior to Admission medications    Medication Sig Start Date End Date Taking?  Authorizing Provider   Glucosamine-Chondroitin--656-640 MG TABS Take 2 tablets by mouth 2 times daily     Yes Historical Provider, MD   insulin glargine (LANTUS) 100 UNIT/ML injection vial Inject 50 Units into the skin 2 times daily     Yes Historical Provider, MD   furosemide (LASIX) 40 MG tablet Take 40 mg by mouth 2 times daily     Yes Historical Provider, MD   oxyCODONE-acetaminophen (PERCOCET) 7.5-325 MG per tablet Take 1 tablet by mouth every 8 hours as needed for Pain .     Yes Historical Provider, MD   albuterol sulfate  (90 Base) MCG/ACT inhaler Inhale 2 puffs into the lungs every 4 hours as needed for Wheezing     Yes Historical Provider, MD   cetirizine-psuedoephedrine (ZYRTEC-D) 5-120 MG per extended release tablet Take 1 tablet by mouth 2 times daily     Yes Historical Provider, MD   albuterol (PROVENTIL) (2.5 MG/3ML) 0.083% nebulizer solution Take 2.5 mg by nebulization every 4 hours     Yes Historical Provider, MD   losartan (COZAAR) 100 MG tablet Take 100 mg by mouth daily     Yes Historical Provider, MD   metolazone (ZAROXOLYN) 2.5 MG tablet Take 2.5 mg by mouth daily     Yes Historical Provider, MD   potassium chloride (KLOR-CON M) 20 MEQ extended release tablet Take 20 mEq by mouth 2 times daily     Yes Historical Provider, MD   ipratropium-albuterol (DUONEB) 0.5-2.5 (3) MG/3ML SOLN nebulizer solution Inhale 1 vial into the lungs 4 times daily     Yes Historical Provider, MD   apixaban (ELIQUIS) 5 MG TABS tablet polyuria. Genitourinary: Positive for scrotal swelling and testicular pain. Negative for difficulty urinating, discharge, dysuria, enuresis, flank pain, frequency, penile pain, penile swelling and urgency. Musculoskeletal: Negative for arthralgias, joint swelling and myalgias. Neurological: Negative for dizziness, tremors, speech difficulty, light-headedness and headaches.         Physical Exam:   /73   Pulse 94   Temp 98.1 °F (36.7 °C) (Oral)   Resp 20   Ht 6' 1\" (1.854 m)   Wt (!) 574 lb 8 oz (260.6 kg)   SpO2 97%   BMI 75.80 kg/m²   Temp (24hrs), Av.4 °F (36.9 °C), Min:98.1 °F (36.7 °C), Max:98.8 °F (37.1 °C)         Recent Labs      17   0036   POCGLU  317*         Intake/Output Summary (Last 24 hours) at 17 0726  Last data filed at 17 0500    Gross per 24 hour   Intake             1382 ml   Output              450 ml   Net              932 ml      Physical Exam   Constitutional: He is oriented to person, place, and time. He appears well-developed and well-nourished. No distress. Morbidly obese   HENT:   Mouth/Throat: Oropharynx is clear and moist. No oropharyngeal exudate. Eyes: Pupils are equal, round, and reactive to light. No scleral icterus. Neck: Neck supple. No JVD present. No tracheal deviation present. No thyromegaly present. Cardiovascular: Normal rate, regular rhythm, normal heart sounds and intact distal pulses. Exam reveals no gallop and no friction rub. No murmur heard. Pulmonary/Chest: Effort normal and breath sounds normal. No respiratory distress. He has no wheezes. He has no rales. He exhibits no tenderness. Abdominal: Soft. Bowel sounds are normal. He exhibits no mass. There is no tenderness. There is no rebound and no guarding. Genitourinary:   Genitourinary Comments: Scrotal swelling, induration, tenderness   Lymphadenopathy:     He has no cervical adenopathy. Neurological: He is alert and oriented to person, place, and time.  No GFR African American >60 >60 mL/min     GFR Comment            GFR Staging NOT REPORTED     POC Glucose Fingerstick     Collection Time: 12/05/17 12:36 AM   Result Value Ref Range     POC Glucose 317 (H) 75 - 110 mg/dL   Lactic acid, plasma     Collection Time: 12/05/17  3:54 AM   Result Value Ref Range     Lactic Acid NOT REPORTED mmol/L     Lactic Acid, Whole Blood 1.7 0.7 - 2.1 mmol/L   Troponin     Collection Time: 12/05/17  5:55 AM   Result Value Ref Range     Troponin T <0.03 <0.03 ng/mL     Troponin Interp          Protime-INR     Collection Time: 12/05/17  5:55 AM   Result Value Ref Range     Protime 11.8 9.4 - 12.6 sec     INR 1.1     CBC auto differential     Collection Time: 12/05/17  5:55 AM   Result Value Ref Range     WBC 14.5 (H) 3.5 - 11.3 k/uL     RBC 4.04 (L) 4.21 - 5.77 m/uL     Hemoglobin 12.0 (L) 13.0 - 17.0 g/dL     Hematocrit 37.4 (L) 40.7 - 50.3 %     MCV 92.6 82.6 - 102.9 fL     MCH 29.7 25.2 - 33.5 pg     MCHC 32.1 28.4 - 34.8 g/dL     RDW 12.5 11.8 - 14.4 %     Platelets 973 685 - 943 k/uL     MPV 10.2 8.1 - 13.5 fL     Differential Type NOT REPORTED       Seg Neutrophils Pending %     Lymphocytes Pending %     Monocytes Pending %     Eosinophils % Pending %     Basophils Pending %     Immature Granulocytes Pending 0 %     Segs Absolute Pending k/uL     Absolute Lymph # Pending k/uL     Absolute Mono # Pending k/uL     Absolute Eos # Pending k/uL     Basophils # Pending 0.0 - 0.2 k/uL     Absolute Immature Granulocyte Pending 0.00 - 0.30 k/uL     WBC Morphology NOT REPORTED       RBC Morphology NOT REPORTED       Platelet Estimate NOT REPORTED     Comprehensive metabolic panel     Collection Time: 12/05/17  5:55 AM   Result Value Ref Range     Glucose 347 (H) 70 - 99 mg/dL     BUN 28 (H) 6 - 20 mg/dL     CREATININE 0.98 0.70 - 1.20 mg/dL     Bun/Cre Ratio NOT REPORTED 9 - 20     Calcium 8.0 (L) 8.6 - 10.4 mg/dL     Sodium 132 (L) 135 - 144 mmol/L     Potassium 4.2 3.7 - 5.3 mmol/L

## 2018-04-14 ENCOUNTER — APPOINTMENT (OUTPATIENT)
Dept: GENERAL RADIOLOGY | Age: 43
End: 2018-04-14
Payer: COMMERCIAL

## 2018-04-14 ENCOUNTER — HOSPITAL ENCOUNTER (EMERGENCY)
Age: 43
Discharge: ANOTHER HOSPITAL | End: 2018-04-15
Attending: FAMILY MEDICINE
Payer: COMMERCIAL

## 2018-04-14 ENCOUNTER — APPOINTMENT (OUTPATIENT)
Dept: CT IMAGING | Age: 43
End: 2018-04-14
Payer: COMMERCIAL

## 2018-04-14 DIAGNOSIS — R06.89 DYSPNEA AND RESPIRATORY ABNORMALITIES: Primary | ICD-10-CM

## 2018-04-14 DIAGNOSIS — R06.00 DYSPNEA AND RESPIRATORY ABNORMALITIES: Primary | ICD-10-CM

## 2018-04-14 LAB
-: NORMAL
ALBUMIN SERPL-MCNC: 3.5 G/DL (ref 3.5–5.2)
ALBUMIN/GLOBULIN RATIO: ABNORMAL (ref 1–2.5)
ALP BLD-CCNC: 69 U/L (ref 40–129)
ALT SERPL-CCNC: 39 U/L (ref 5–41)
AMORPHOUS: NORMAL
ANION GAP SERPL CALCULATED.3IONS-SCNC: 12 MMOL/L (ref 9–17)
AST SERPL-CCNC: 33 U/L
BACTERIA: NORMAL
BILIRUB SERPL-MCNC: 0.33 MG/DL (ref 0.3–1.2)
BILIRUBIN URINE: NEGATIVE
BNP INTERPRETATION: ABNORMAL
BUN BLDV-MCNC: 34 MG/DL (ref 6–20)
BUN/CREAT BLD: 32 (ref 9–20)
CALCIUM SERPL-MCNC: 9.1 MG/DL (ref 8.6–10.4)
CASTS UA: NORMAL /LPF
CHLORIDE BLD-SCNC: 97 MMOL/L (ref 98–107)
CO2: 28 MMOL/L (ref 20–31)
COLOR: YELLOW
COMMENT UA: ABNORMAL
CREAT SERPL-MCNC: 1.06 MG/DL (ref 0.7–1.2)
CRYSTALS, UA: NORMAL /HPF
D-DIMER QUANTITATIVE: 0.9 MG/L FEU (ref 0–0.5)
EKG ATRIAL RATE: 77 BPM
EKG P-R INTERVAL: 168 MS
EKG Q-T INTERVAL: 422 MS
EKG QRS DURATION: 142 MS
EKG QTC CALCULATION (BAZETT): 477 MS
EKG R AXIS: -72 DEGREES
EKG T AXIS: 50 DEGREES
EKG VENTRICULAR RATE: 77 BPM
EPITHELIAL CELLS UA: NORMAL /HPF
GFR AFRICAN AMERICAN: >60 ML/MIN
GFR NON-AFRICAN AMERICAN: >60 ML/MIN
GFR SERPL CREATININE-BSD FRML MDRD: ABNORMAL ML/MIN/{1.73_M2}
GFR SERPL CREATININE-BSD FRML MDRD: ABNORMAL ML/MIN/{1.73_M2}
GLUCOSE BLD-MCNC: 127 MG/DL (ref 65–99)
GLUCOSE BLD-MCNC: 222 MG/DL (ref 70–99)
GLUCOSE URINE: NEGATIVE
HCT VFR BLD CALC: 35.1 % (ref 41–53)
HEMOGLOBIN: 11.8 G/DL (ref 13.5–17.5)
INR BLD: 1.1
KETONES, URINE: NEGATIVE
LEUKOCYTE ESTERASE, URINE: NEGATIVE
MCH RBC QN AUTO: 28.6 PG (ref 26–34)
MCHC RBC AUTO-ENTMCNC: 33.5 G/DL (ref 31–37)
MCV RBC AUTO: 85.4 FL (ref 80–100)
MUCUS: NORMAL
NITRITE, URINE: NEGATIVE
NRBC AUTOMATED: ABNORMAL PER 100 WBC
OTHER OBSERVATIONS UA: NORMAL
PARTIAL THROMBOPLASTIN TIME: 32.9 SEC (ref 21–33)
PDW BLD-RTO: 14.7 % (ref 12.1–15.2)
PH UA: 6 (ref 5–8)
PLATELET # BLD: 301 K/UL (ref 140–450)
PMV BLD AUTO: ABNORMAL FL (ref 6–12)
POTASSIUM SERPL-SCNC: 4.2 MMOL/L (ref 3.7–5.3)
PRO-BNP: 944 PG/ML
PROTEIN UA: ABNORMAL
PROTHROMBIN TIME: 11.3 SEC (ref 9–11.6)
RBC # BLD: 4.1 M/UL (ref 4.5–5.9)
RBC UA: NORMAL /HPF (ref 0–2)
RENAL EPITHELIAL, UA: NORMAL /HPF
SODIUM BLD-SCNC: 137 MMOL/L (ref 135–144)
SPECIFIC GRAVITY UA: 1.01 (ref 1–1.03)
TOTAL PROTEIN: 8.2 G/DL (ref 6.4–8.3)
TRICHOMONAS: NORMAL
TROPONIN INTERP: NORMAL
TROPONIN T: <0.03 NG/ML
TURBIDITY: CLEAR
URINE HGB: ABNORMAL
UROBILINOGEN, URINE: NORMAL
WBC # BLD: 10.8 K/UL (ref 3.5–11)
WBC UA: NORMAL /HPF
YEAST: NORMAL

## 2018-04-14 PROCEDURE — 83880 ASSAY OF NATRIURETIC PEPTIDE: CPT

## 2018-04-14 PROCEDURE — 6360000002 HC RX W HCPCS: Performed by: FAMILY MEDICINE

## 2018-04-14 PROCEDURE — 85610 PROTHROMBIN TIME: CPT

## 2018-04-14 PROCEDURE — 85379 FIBRIN DEGRADATION QUANT: CPT

## 2018-04-14 PROCEDURE — 96365 THER/PROPH/DIAG IV INF INIT: CPT

## 2018-04-14 PROCEDURE — 82947 ASSAY GLUCOSE BLOOD QUANT: CPT

## 2018-04-14 PROCEDURE — 99285 EMERGENCY DEPT VISIT HI MDM: CPT

## 2018-04-14 PROCEDURE — 93005 ELECTROCARDIOGRAM TRACING: CPT

## 2018-04-14 PROCEDURE — 36415 COLL VENOUS BLD VENIPUNCTURE: CPT

## 2018-04-14 PROCEDURE — 85027 COMPLETE CBC AUTOMATED: CPT

## 2018-04-14 PROCEDURE — 94664 DEMO&/EVAL PT USE INHALER: CPT

## 2018-04-14 PROCEDURE — 85730 THROMBOPLASTIN TIME PARTIAL: CPT

## 2018-04-14 PROCEDURE — 80053 COMPREHEN METABOLIC PANEL: CPT

## 2018-04-14 PROCEDURE — 71046 X-RAY EXAM CHEST 2 VIEWS: CPT

## 2018-04-14 PROCEDURE — 81001 URINALYSIS AUTO W/SCOPE: CPT

## 2018-04-14 PROCEDURE — 84484 ASSAY OF TROPONIN QUANT: CPT

## 2018-04-14 RX ORDER — ALBUTEROL SULFATE 2.5 MG/3ML
2.5 SOLUTION RESPIRATORY (INHALATION) ONCE
Status: COMPLETED | OUTPATIENT
Start: 2018-04-14 | End: 2018-04-14

## 2018-04-14 RX ORDER — HEPARIN SODIUM 1000 [USP'U]/ML
10000 INJECTION, SOLUTION INTRAVENOUS; SUBCUTANEOUS PRN
Status: DISCONTINUED | OUTPATIENT
Start: 2018-04-14 | End: 2018-04-15 | Stop reason: HOSPADM

## 2018-04-14 RX ORDER — HEPARIN SODIUM 1000 [USP'U]/ML
5000 INJECTION, SOLUTION INTRAVENOUS; SUBCUTANEOUS PRN
Status: DISCONTINUED | OUTPATIENT
Start: 2018-04-14 | End: 2018-04-15 | Stop reason: HOSPADM

## 2018-04-14 RX ORDER — HEPARIN SODIUM 1000 [USP'U]/ML
10000 INJECTION, SOLUTION INTRAVENOUS; SUBCUTANEOUS ONCE
Status: COMPLETED | OUTPATIENT
Start: 2018-04-14 | End: 2018-04-14

## 2018-04-14 RX ORDER — ATENOLOL 25 MG/1
50 TABLET ORAL DAILY
COMMUNITY
End: 2022-09-22

## 2018-04-14 RX ADMIN — HEPARIN SODIUM 10000 UNITS: 1000 INJECTION, SOLUTION INTRAVENOUS; SUBCUTANEOUS at 23:26

## 2018-04-14 RX ADMIN — HEPARIN SODIUM 2100 UNITS/HR: 10000 INJECTION, SOLUTION INTRAVENOUS at 23:26

## 2018-04-14 RX ADMIN — ALBUTEROL SULFATE 2.5 MG: 2.5 SOLUTION RESPIRATORY (INHALATION) at 21:17

## 2018-04-14 ASSESSMENT — PAIN SCALES - GENERAL: PAINLEVEL_OUTOF10: 7

## 2018-04-15 VITALS
RESPIRATION RATE: 18 BRPM | TEMPERATURE: 97.7 F | BODY MASS INDEX: 77.42 KG/M2 | HEART RATE: 108 BPM | SYSTOLIC BLOOD PRESSURE: 120 MMHG | OXYGEN SATURATION: 97 % | WEIGHT: 315 LBS | DIASTOLIC BLOOD PRESSURE: 96 MMHG

## 2018-04-15 LAB
GLUCOSE BLD-MCNC: 112 MG/DL (ref 65–99)
GLUCOSE BLD-MCNC: 138 MG/DL (ref 65–99)
PARTIAL THROMBOPLASTIN TIME: 38.6 SEC (ref 21–33)

## 2018-04-15 PROCEDURE — 6370000000 HC RX 637 (ALT 250 FOR IP): Performed by: FAMILY MEDICINE

## 2018-04-15 PROCEDURE — 85730 THROMBOPLASTIN TIME PARTIAL: CPT

## 2018-04-15 PROCEDURE — 82947 ASSAY GLUCOSE BLOOD QUANT: CPT

## 2018-04-15 PROCEDURE — 96366 THER/PROPH/DIAG IV INF ADDON: CPT

## 2018-04-15 PROCEDURE — 6360000002 HC RX W HCPCS: Performed by: FAMILY MEDICINE

## 2018-04-15 PROCEDURE — 36415 COLL VENOUS BLD VENIPUNCTURE: CPT

## 2018-04-15 PROCEDURE — 94640 AIRWAY INHALATION TREATMENT: CPT

## 2018-04-15 RX ORDER — OXYCODONE HYDROCHLORIDE AND ACETAMINOPHEN 5; 325 MG/1; MG/1
2 TABLET ORAL ONCE
Status: COMPLETED | OUTPATIENT
Start: 2018-04-15 | End: 2018-04-15

## 2018-04-15 RX ORDER — IPRATROPIUM BROMIDE AND ALBUTEROL SULFATE 2.5; .5 MG/3ML; MG/3ML
1 SOLUTION RESPIRATORY (INHALATION) ONCE
Status: COMPLETED | OUTPATIENT
Start: 2018-04-15 | End: 2018-04-15

## 2018-04-15 RX ORDER — OXYCODONE HYDROCHLORIDE AND ACETAMINOPHEN 5; 325 MG/1; MG/1
TABLET ORAL
Status: DISCONTINUED
Start: 2018-04-15 | End: 2018-04-15 | Stop reason: WASHOUT

## 2018-04-15 RX ADMIN — IPRATROPIUM BROMIDE AND ALBUTEROL SULFATE 1 AMPULE: .5; 3 SOLUTION RESPIRATORY (INHALATION) at 00:55

## 2018-04-15 RX ADMIN — OXYCODONE HYDROCHLORIDE AND ACETAMINOPHEN 2 TABLET: 5; 325 TABLET ORAL at 08:04

## 2018-04-15 RX ADMIN — HEPARIN SODIUM 5000 UNITS: 1000 INJECTION, SOLUTION INTRAVENOUS; SUBCUTANEOUS at 06:05

## 2018-04-15 RX ADMIN — OXYCODONE HYDROCHLORIDE AND ACETAMINOPHEN 2 TABLET: 5; 325 TABLET ORAL at 02:42

## 2018-04-15 ASSESSMENT — PAIN SCALES - GENERAL
PAINLEVEL_OUTOF10: 7
PAINLEVEL_OUTOF10: 7
PAINLEVEL_OUTOF10: 9

## 2018-04-15 NOTE — ED NOTES
[id number] [facility] ambulance service here, report given to EMS team. Pt care transferred to EMS personal. Pt transferred to [facility] via [id number] [facility] ambulance service with all his belongings.       Dereje Pino RN  04/15/18 2308

## 2018-04-15 NOTE — ED NOTES
Spoke with jonel at [facility] to arrange transport to call back     Horace Hong RN  04/15/18 [id number]

## 2018-04-15 NOTE — ED PROVIDER NOTES
eMERGENCY dEPARTMENT eNCOUnter        [address] [facility]    Chief Complaint   Patient presents with    Chest Pain     tightness across chest past few days    Shortness of Breath     mostly with activity    Neck Pain     into left arm    Leg Pain     states feels like he's not getting blood flow       HPI    Jorgito Larson is a 39 y.o. male who presents with severe shortness of breath for 2 days. Patient does have a history of COPD. He also has a history of chronic Atrial fibrillation. He has also had neck pain with radiation in the left upper extremity for 2 days. Patient also has a history of chronic bilateral knee pain and chronic lower leg  pain bilaterally. REVIEW OF SYSTEMS    Systems reviewed and positives are in the history of present illness.     PAST MEDICAL HISTORY    Past Medical History:   Diagnosis Date    Arthritis     of the knees    Asthma     Blood circulation, collateral     COPD (chronic obstructive pulmonary disease) ([address]     Former smoker     Hypertension     [facility]     ETTA (obstructive sleep apnea)     PAF (paroxysmal atrial fibrillation) [facility]     Pneumonia     Uncontrolled diabetes mellitus with hyperglycemia ([facility]        SURGICAL HISTORY    Past Surgical History:   Procedure Laterality Date    INCISION AND DRAINAGE N/A 12/5/2017    SCROTAL INCISION AND DRAINAGE, DIFFICULT PRAJAPATI INSERTION performed by Sven Rogel MD at [address] [place] [address] Right 2012    Wound debridement to R Leg wound    OTHER SURGICAL HISTORY  12/05/2017    I & D scrotum       CURRENT MEDICATIONS    Current Outpatient Rx   Medication Sig Dispense Refill    insulin regular human (HUMULIN R) 500 UNIT/ML concentrated injection vial Inject into the skin      atenolol (TENORMIN) 25 MG tablet Take 50 mg by mouth daily      aspirin 81 MG EC tablet Take 1 tablet by mouth daily 30 tablet 3    furosemide (LASIX) 40 MG tablet Take 40 mg by mouth 2 times daily      cetirizine-psuedoephedrine (ZYRTEC-D) 5-120 MG per extended release tablet Take 1 tablet by mouth 2 times daily      losartan (COZAAR) 100 MG tablet Take 100 mg by mouth daily      metolazone (ZAROXOLYN) 2.5 MG tablet Take 2.5 mg by mouth daily      potassium chloride (KLOR-CON M) 20 MEQ extended release tablet Take 20 mEq by mouth 2 times daily      ipratropium-albuterol (DUONEB) 0.5-2.5 (3) MG/3ML SOLN nebulizer solution Inhale 1 vial into the lungs 4 times daily      apixaban (ELIQUIS) 5 MG TABS tablet Take 5 mg by mouth 2 times daily      diltiazem (CARDIZEM CD) 240 MG extended release capsule Take 240 mg by mouth daily      oxyCODONE-acetaminophen (PERCOCET) 7.5-325 MG per tablet Take 1 tablet by mouth every 8 hours as needed for Pain .  20 tablet 0    Glucosamine-Chondroitin--300-250 MG TABS Take 2 tablets by mouth 2 times daily      albuterol sulfate  (90 Base) MCG/ACT inhaler Inhale 2 puffs into the lungs every 4 hours as needed for Wheezing      albuterol (PROVENTIL) (2.5 MG/3ML) 0.083% nebulizer solution Take 2.5 mg by nebulization every 4 hours         ALLERGIES    No Known Allergies    FAMILY HISTORY    Family History   Problem Relation Age of Onset    Asthma Father     Cancer Father     Other Father     Early Death Paternal Grandfather        SOCIAL HISTORY    Social History     Social History    Marital status:      Spouse name: N/A    Number of children: N/A    Years of education: N/A     Social History Main Topics    Smoking status: Former Smoker     Packs/day: 1.00     Types: Cigarettes     Start date: 3/25/2015     Quit date: 7/5/2017    Smokeless tobacco: Never Used      Comment: cut down to 1/2 ppd 2/18/17    Alcohol use Yes      Comment: 1-2 times per year    Drug use: No    Sexual activity: Not Currently     Other Topics Concern    None     Social History Narrative    None       PHYSICAL EXAM    VITAL SIGNS: /70   Pulse 112   Temp 97.7 °F APTT   APTT   . Unable to obtain CTA of the chest due to CT machine not being able to accommodate patient due to patient's weight. Patient started on Heparin due to high risk for PE with elevated D-DImer. Patient will be transferred to [facility]. I spoke with Dr. Renetta Smalls, and he accepted transfer. FINAL IMPRESSION    1. Dyspnea  2. Summation      Patient Course: Unable to Obtain CTA of chest due to patient, weight. Patient will be transferred to [facility]. I spoke with Dr. Renetta Smalls, and he accepted transfer. ED Medications administered this visit:    Medications   heparin (porcine) injection 10,000 Units (not administered)   heparin (porcine) injection 5,000 Units (not administered)   heparin 25,000 unit in sodium chloride 0.45% 250 mL infusion (2,100 Units/hr Intravenous New Bag 4/14/18 2326)   albuterol (PROVENTIL) nebulizer solution 2.5 mg (2.5 mg Nebulization Given 4/14/18 2117)   heparin (porcine) injection 10,000 Units (10,000 Units Intravenous Given 4/14/18 2326)   ipratropium-albuterol (DUONEB) nebulizer solution 1 ampule (1 ampule Inhalation Given 4/15/18 0055)   oxyCODONE-acetaminophen (PERCOCET) 5-325 MG per tablet 2 tablet (2 tablets Oral Given 4/15/18 0242)       New Prescriptions from this visit:    New Prescriptions    No medications on file       Follow-up:  No follow-up provider specified. Final Impression:   1.  Dyspnea and respiratory abnormalities               (Please note that portions of this note were completed with a voice recognition program.  Efforts were made to edit the dictations but occasionally words are mis-transcribed.)       Acacia Smith MD  04/15/18 3042

## 2021-02-11 ENCOUNTER — HOSPITAL ENCOUNTER (OUTPATIENT)
Age: 46
Setting detail: SPECIMEN
Discharge: HOME | End: 2021-02-11
Payer: COMMERCIAL

## 2021-02-11 LAB
ABSOLUTE EOS #: 1.86 K/UL (ref 0–0.4)
ABSOLUTE IMMATURE GRANULOCYTE: ABNORMAL K/UL (ref 0–0.3)
ABSOLUTE LYMPH #: 1.95 K/UL (ref 1–4.8)
ABSOLUTE MONO #: 0.56 K/UL (ref 0–1)
ALBUMIN SERPL-MCNC: 3.9 G/DL (ref 3.5–5.2)
ANION GAP SERPL CALCULATED.3IONS-SCNC: 13 MMOL/L (ref 9–17)
BASOPHILS # BLD: ABNORMAL % (ref 0–2)
BASOPHILS ABSOLUTE: ABNORMAL K/UL (ref 0–0.2)
BILIRUBIN URINE: NEGATIVE
BUN BLDV-MCNC: 37 MG/DL (ref 6–20)
BUN/CREAT BLD: 26 (ref 9–20)
CALCIUM SERPL-MCNC: 10.2 MG/DL (ref 8.6–10.4)
CHLORIDE BLD-SCNC: 89 MMOL/L (ref 98–107)
CO2: 27 MMOL/L (ref 20–31)
COLOR: YELLOW
COMMENT UA: ABNORMAL
CREAT SERPL-MCNC: 1.4 MG/DL (ref 0.7–1.2)
DIFFERENTIAL TYPE: ABNORMAL
EOSINOPHILS RELATIVE PERCENT: 20 % (ref 0–5)
GFR AFRICAN AMERICAN: >60 ML/MIN
GFR NON-AFRICAN AMERICAN: 55 ML/MIN
GFR SERPL CREATININE-BSD FRML MDRD: ABNORMAL ML/MIN/{1.73_M2}
GFR SERPL CREATININE-BSD FRML MDRD: ABNORMAL ML/MIN/{1.73_M2}
GLUCOSE BLD-MCNC: 357 MG/DL (ref 70–99)
GLUCOSE URINE: ABNORMAL
HCT VFR BLD CALC: 36.9 % (ref 41–53)
HEMOGLOBIN: 12.6 G/DL (ref 13.5–17.5)
IMMATURE GRANULOCYTES: ABNORMAL %
KETONES, URINE: NEGATIVE
LEUKOCYTE ESTERASE, URINE: ABNORMAL
LYMPHOCYTES # BLD: 21 % (ref 13–44)
MCH RBC QN AUTO: 29.8 PG (ref 26–34)
MCHC RBC AUTO-ENTMCNC: 34.1 G/DL (ref 31–37)
MCV RBC AUTO: 87.4 FL (ref 80–100)
MONOCYTES # BLD: 6 % (ref 5–9)
MORPHOLOGY: ABNORMAL
NITRITE, URINE: NEGATIVE
NRBC AUTOMATED: ABNORMAL PER 100 WBC
PDW BLD-RTO: 16.7 % (ref 12.1–15.2)
PH UA: 5 (ref 5–8)
PHOSPHORUS: 3.8 MG/DL (ref 2.5–4.5)
PLATELET # BLD: 275 K/UL (ref 140–450)
PLATELET ESTIMATE: ABNORMAL
PMV BLD AUTO: ABNORMAL FL (ref 6–12)
POTASSIUM SERPL-SCNC: 3.8 MMOL/L (ref 3.7–5.3)
PROTEIN UA: ABNORMAL
RBC # BLD: 4.22 M/UL (ref 4.5–5.9)
RBC # BLD: ABNORMAL 10*6/UL
SEG NEUTROPHILS: 53 % (ref 39–75)
SEGMENTED NEUTROPHILS ABSOLUTE COUNT: 4.93 K/UL (ref 2.1–6.5)
SODIUM BLD-SCNC: 129 MMOL/L (ref 135–144)
SODIUM,UR: 37 MMOL/L
SPECIFIC GRAVITY UA: 1.01 (ref 1–1.03)
TURBIDITY: ABNORMAL
URINE HGB: ABNORMAL
UROBILINOGEN, URINE: NORMAL
WBC # BLD: 9.3 K/UL (ref 3.5–11)
WBC # BLD: ABNORMAL 10*3/UL

## 2021-02-11 PROCEDURE — 36415 COLL VENOUS BLD VENIPUNCTURE: CPT

## 2021-02-11 PROCEDURE — 81003 URINALYSIS AUTO W/O SCOPE: CPT

## 2021-02-11 PROCEDURE — 80069 RENAL FUNCTION PANEL: CPT

## 2021-02-11 PROCEDURE — 84300 ASSAY OF URINE SODIUM: CPT

## 2021-02-11 PROCEDURE — 85025 COMPLETE CBC W/AUTO DIFF WBC: CPT

## 2022-06-29 ENCOUNTER — HOSPITAL ENCOUNTER (OUTPATIENT)
Age: 47
Setting detail: SPECIMEN
Discharge: HOME | End: 2022-06-29

## 2022-06-29 LAB
ABSOLUTE EOS #: 2.14 K/UL (ref 0–0.44)
ABSOLUTE IMMATURE GRANULOCYTE: 0.12 K/UL (ref 0–0.3)
ABSOLUTE LYMPH #: 1.43 K/UL (ref 1.1–3.7)
ABSOLUTE MONO #: 0.83 K/UL (ref 0.1–1.2)
ALBUMIN SERPL-MCNC: 3.2 G/DL (ref 3.5–5.2)
ALP BLD-CCNC: 109 U/L (ref 40–129)
ALT SERPL-CCNC: 20 U/L (ref 5–41)
ANION GAP SERPL CALCULATED.3IONS-SCNC: 7 MMOL/L (ref 9–17)
AST SERPL-CCNC: 32 U/L
BASOPHILS # BLD: 1 % (ref 0–2)
BASOPHILS ABSOLUTE: 0.12 K/UL (ref 0–0.2)
BILIRUB SERPL-MCNC: 0.41 MG/DL (ref 0.3–1.2)
BUN BLDV-MCNC: 34 MG/DL (ref 6–20)
BUN/CREAT BLD: 34 (ref 9–20)
CALCIUM SERPL-MCNC: 8.8 MG/DL (ref 8.6–10.4)
CHLORIDE BLD-SCNC: 98 MMOL/L (ref 98–107)
CO2: 31 MMOL/L (ref 20–31)
CREAT SERPL-MCNC: 0.99 MG/DL (ref 0.7–1.2)
EOSINOPHILS RELATIVE PERCENT: 18 % (ref 1–4)
GFR AFRICAN AMERICAN: >60 ML/MIN
GFR NON-AFRICAN AMERICAN: >60 ML/MIN
GFR SERPL CREATININE-BSD FRML MDRD: ABNORMAL ML/MIN/{1.73_M2}
GLUCOSE BLD-MCNC: 202 MG/DL (ref 70–99)
HCT VFR BLD CALC: 34.3 % (ref 40.7–50.3)
HEMOGLOBIN: 10.1 G/DL (ref 13–17)
IMMATURE GRANULOCYTES: 1 %
LYMPHOCYTES # BLD: 12 % (ref 24–43)
MCH RBC QN AUTO: 25.6 PG (ref 25.2–33.5)
MCHC RBC AUTO-ENTMCNC: 29.4 G/DL (ref 28.4–34.8)
MCV RBC AUTO: 86.8 FL (ref 82.6–102.9)
MONOCYTES # BLD: 7 % (ref 3–12)
NRBC AUTOMATED: 0 PER 100 WBC
PDW BLD-RTO: 17.3 % (ref 11.8–14.4)
PLATELET # BLD: 283 K/UL (ref 138–453)
PMV BLD AUTO: 9.9 FL (ref 8.1–13.5)
POTASSIUM SERPL-SCNC: 4 MMOL/L (ref 3.7–5.3)
RBC # BLD: 3.95 M/UL (ref 4.21–5.77)
SEG NEUTROPHILS: 61 % (ref 36–65)
SEGMENTED NEUTROPHILS ABSOLUTE COUNT: 7.26 K/UL (ref 1.5–8.1)
SODIUM BLD-SCNC: 136 MMOL/L (ref 135–144)
TOTAL PROTEIN: 9.3 G/DL (ref 6.4–8.3)
WBC # BLD: 11.9 K/UL (ref 3.5–11.3)

## 2022-06-29 PROCEDURE — 85025 COMPLETE CBC W/AUTO DIFF WBC: CPT

## 2022-06-29 PROCEDURE — 80053 COMPREHEN METABOLIC PANEL: CPT

## 2022-06-29 PROCEDURE — P9603 ONE-WAY ALLOW PRORATED MILES: HCPCS

## 2022-07-05 ENCOUNTER — HOSPITAL ENCOUNTER (OUTPATIENT)
Age: 47
Setting detail: SPECIMEN
Discharge: HOME | End: 2022-07-05

## 2022-07-05 LAB
ANION GAP SERPL CALCULATED.3IONS-SCNC: 6 MMOL/L (ref 9–17)
BUN BLDV-MCNC: 29 MG/DL (ref 6–20)
BUN/CREAT BLD: 29 (ref 9–20)
CALCIUM SERPL-MCNC: 8.5 MG/DL (ref 8.6–10.4)
CHLORIDE BLD-SCNC: 98 MMOL/L (ref 98–107)
CO2: 32 MMOL/L (ref 20–31)
CREAT SERPL-MCNC: 1.01 MG/DL (ref 0.7–1.2)
GFR AFRICAN AMERICAN: >60 ML/MIN
GFR NON-AFRICAN AMERICAN: >60 ML/MIN
GFR SERPL CREATININE-BSD FRML MDRD: ABNORMAL ML/MIN/{1.73_M2}
GLUCOSE BLD-MCNC: 278 MG/DL (ref 70–99)
HCT VFR BLD CALC: 31.2 % (ref 40.7–50.3)
HEMOGLOBIN: 9.3 G/DL (ref 13–17)
MCH RBC QN AUTO: 25.7 PG (ref 25.2–33.5)
MCHC RBC AUTO-ENTMCNC: 29.8 G/DL (ref 28.4–34.8)
MCV RBC AUTO: 86.2 FL (ref 82.6–102.9)
NRBC AUTOMATED: 0 PER 100 WBC
PDW BLD-RTO: 17.2 % (ref 11.8–14.4)
PLATELET # BLD: 229 K/UL (ref 138–453)
PMV BLD AUTO: 10.3 FL (ref 8.1–13.5)
POTASSIUM SERPL-SCNC: 3.7 MMOL/L (ref 3.7–5.3)
RBC # BLD: 3.62 M/UL (ref 4.21–5.77)
SODIUM BLD-SCNC: 136 MMOL/L (ref 135–144)
WBC # BLD: 9.9 K/UL (ref 3.5–11.3)

## 2022-07-05 PROCEDURE — 36415 COLL VENOUS BLD VENIPUNCTURE: CPT

## 2022-07-05 PROCEDURE — 80048 BASIC METABOLIC PNL TOTAL CA: CPT

## 2022-07-05 PROCEDURE — 85027 COMPLETE CBC AUTOMATED: CPT

## 2022-07-05 PROCEDURE — P9603 ONE-WAY ALLOW PRORATED MILES: HCPCS

## 2022-07-12 ENCOUNTER — HOSPITAL ENCOUNTER (OUTPATIENT)
Age: 47
Setting detail: SPECIMEN
Discharge: HOME | End: 2022-07-12

## 2022-07-12 LAB
ANION GAP SERPL CALCULATED.3IONS-SCNC: 8 MMOL/L (ref 9–17)
BUN BLDV-MCNC: 25 MG/DL (ref 6–20)
BUN/CREAT BLD: 26 (ref 9–20)
CALCIUM SERPL-MCNC: 8.3 MG/DL (ref 8.6–10.4)
CHLORIDE BLD-SCNC: 97 MMOL/L (ref 98–107)
CO2: 29 MMOL/L (ref 20–31)
CREAT SERPL-MCNC: 0.97 MG/DL (ref 0.7–1.2)
GFR AFRICAN AMERICAN: >60 ML/MIN
GFR NON-AFRICAN AMERICAN: >60 ML/MIN
GFR SERPL CREATININE-BSD FRML MDRD: ABNORMAL ML/MIN/{1.73_M2}
GLUCOSE BLD-MCNC: 277 MG/DL (ref 70–99)
HCT VFR BLD CALC: 32.3 % (ref 40.7–50.3)
HEMOGLOBIN: 9.3 G/DL (ref 13–17)
MCH RBC QN AUTO: 25.6 PG (ref 25.2–33.5)
MCHC RBC AUTO-ENTMCNC: 28.8 G/DL (ref 28.4–34.8)
MCV RBC AUTO: 89 FL (ref 82.6–102.9)
NRBC AUTOMATED: 0 PER 100 WBC
PDW BLD-RTO: 18.1 % (ref 11.8–14.4)
PLATELET # BLD: 269 K/UL (ref 138–453)
PMV BLD AUTO: 10.8 FL (ref 8.1–13.5)
POTASSIUM SERPL-SCNC: 3.8 MMOL/L (ref 3.7–5.3)
RBC # BLD: 3.63 M/UL (ref 4.21–5.77)
SODIUM BLD-SCNC: 134 MMOL/L (ref 135–144)
WBC # BLD: 10 K/UL (ref 3.5–11.3)

## 2022-07-12 PROCEDURE — 80048 BASIC METABOLIC PNL TOTAL CA: CPT

## 2022-07-12 PROCEDURE — P9603 ONE-WAY ALLOW PRORATED MILES: HCPCS

## 2022-07-12 PROCEDURE — 85027 COMPLETE CBC AUTOMATED: CPT

## 2022-07-12 PROCEDURE — 36415 COLL VENOUS BLD VENIPUNCTURE: CPT

## 2022-07-19 ENCOUNTER — HOSPITAL ENCOUNTER (OUTPATIENT)
Age: 47
Setting detail: SPECIMEN
Discharge: HOME | End: 2022-07-19

## 2022-07-19 LAB
ANION GAP SERPL CALCULATED.3IONS-SCNC: 9 MMOL/L (ref 9–17)
BUN BLDV-MCNC: 25 MG/DL (ref 6–20)
BUN/CREAT BLD: 25 (ref 9–20)
CALCIUM SERPL-MCNC: 8.4 MG/DL (ref 8.6–10.4)
CHLORIDE BLD-SCNC: 98 MMOL/L (ref 98–107)
CO2: 27 MMOL/L (ref 20–31)
CREAT SERPL-MCNC: 1.01 MG/DL (ref 0.7–1.2)
GFR AFRICAN AMERICAN: >60 ML/MIN
GFR NON-AFRICAN AMERICAN: >60 ML/MIN
GFR SERPL CREATININE-BSD FRML MDRD: ABNORMAL ML/MIN/{1.73_M2}
GLUCOSE BLD-MCNC: 219 MG/DL (ref 70–99)
HCT VFR BLD CALC: 33.4 % (ref 40.7–50.3)
HEMOGLOBIN: 9.2 G/DL (ref 13–17)
MCH RBC QN AUTO: 25.8 PG (ref 25.2–33.5)
MCHC RBC AUTO-ENTMCNC: 27.5 G/DL (ref 28.4–34.8)
MCV RBC AUTO: 93.6 FL (ref 82.6–102.9)
NRBC AUTOMATED: 0 PER 100 WBC
PDW BLD-RTO: 18.6 % (ref 11.8–14.4)
PLATELET # BLD: 181 K/UL (ref 138–453)
PMV BLD AUTO: 11.9 FL (ref 8.1–13.5)
POTASSIUM SERPL-SCNC: 3.7 MMOL/L (ref 3.7–5.3)
RBC # BLD: 3.57 M/UL (ref 4.21–5.77)
SODIUM BLD-SCNC: 134 MMOL/L (ref 135–144)
WBC # BLD: 9.6 K/UL (ref 3.5–11.3)

## 2022-07-19 PROCEDURE — 80048 BASIC METABOLIC PNL TOTAL CA: CPT

## 2022-07-19 PROCEDURE — 85027 COMPLETE CBC AUTOMATED: CPT

## 2022-07-19 PROCEDURE — P9603 ONE-WAY ALLOW PRORATED MILES: HCPCS

## 2022-07-19 PROCEDURE — 36415 COLL VENOUS BLD VENIPUNCTURE: CPT

## 2022-07-25 ENCOUNTER — HOSPITAL ENCOUNTER (OUTPATIENT)
Age: 47
Setting detail: SPECIMEN
Discharge: HOME | End: 2022-07-25

## 2022-07-25 PROCEDURE — 87086 URINE CULTURE/COLONY COUNT: CPT

## 2022-07-25 PROCEDURE — 87077 CULTURE AEROBIC IDENTIFY: CPT

## 2022-07-25 PROCEDURE — 87186 SC STD MICRODIL/AGAR DIL: CPT

## 2022-07-25 PROCEDURE — 81001 URINALYSIS AUTO W/SCOPE: CPT

## 2022-07-26 ENCOUNTER — HOSPITAL ENCOUNTER (OUTPATIENT)
Age: 47
Setting detail: SPECIMEN
Discharge: HOME | End: 2022-07-26

## 2022-07-26 LAB
-: ABNORMAL
ANION GAP SERPL CALCULATED.3IONS-SCNC: 9 MMOL/L (ref 9–17)
BACTERIA: ABNORMAL
BILIRUBIN URINE: NEGATIVE
BUN BLDV-MCNC: 35 MG/DL (ref 6–20)
BUN/CREAT BLD: 25 (ref 9–20)
CALCIUM SERPL-MCNC: 7.9 MG/DL (ref 8.6–10.4)
CHLORIDE BLD-SCNC: 93 MMOL/L (ref 98–107)
CO2: 28 MMOL/L (ref 20–31)
COLOR: YELLOW
CREAT SERPL-MCNC: 1.39 MG/DL (ref 0.7–1.2)
EPITHELIAL CELLS UA: ABNORMAL /HPF (ref 0–5)
GFR AFRICAN AMERICAN: >60 ML/MIN
GFR NON-AFRICAN AMERICAN: 55 ML/MIN
GFR SERPL CREATININE-BSD FRML MDRD: ABNORMAL ML/MIN/{1.73_M2}
GLUCOSE BLD-MCNC: 170 MG/DL (ref 70–99)
GLUCOSE URINE: NEGATIVE
HCT VFR BLD CALC: 31.8 % (ref 40.7–50.3)
HEMOGLOBIN: 9.2 G/DL (ref 13–17)
KETONES, URINE: NEGATIVE
LEUKOCYTE ESTERASE, URINE: ABNORMAL
MCH RBC QN AUTO: 25.5 PG (ref 25.2–33.5)
MCHC RBC AUTO-ENTMCNC: 28.9 G/DL (ref 28.4–34.8)
MCV RBC AUTO: 88.1 FL (ref 82.6–102.9)
NITRITE, URINE: POSITIVE
NRBC AUTOMATED: 0 PER 100 WBC
PDW BLD-RTO: 18.4 % (ref 11.8–14.4)
PH UA: 6 (ref 5–8)
PLATELET # BLD: 177 K/UL (ref 138–453)
PMV BLD AUTO: 10.6 FL (ref 8.1–13.5)
POTASSIUM SERPL-SCNC: 3.5 MMOL/L (ref 3.7–5.3)
PROTEIN UA: ABNORMAL
RBC # BLD: 3.61 M/UL (ref 4.21–5.77)
RBC UA: ABNORMAL /HPF (ref 0–2)
SODIUM BLD-SCNC: 130 MMOL/L (ref 135–144)
SPECIFIC GRAVITY UA: 1.01 (ref 1–1.03)
TURBIDITY: ABNORMAL
URINE HGB: ABNORMAL
UROBILINOGEN, URINE: NORMAL
WBC # BLD: 5.7 K/UL (ref 3.5–11.3)
WBC UA: ABNORMAL /HPF (ref 0–5)

## 2022-07-26 PROCEDURE — 87040 BLOOD CULTURE FOR BACTERIA: CPT

## 2022-07-26 PROCEDURE — 36415 COLL VENOUS BLD VENIPUNCTURE: CPT

## 2022-07-26 PROCEDURE — 80048 BASIC METABOLIC PNL TOTAL CA: CPT

## 2022-07-26 PROCEDURE — 85027 COMPLETE CBC AUTOMATED: CPT

## 2022-07-26 PROCEDURE — P9603 ONE-WAY ALLOW PRORATED MILES: HCPCS

## 2022-07-28 LAB
CULTURE: ABNORMAL
CULTURE: ABNORMAL
SPECIMEN DESCRIPTION: ABNORMAL

## 2022-07-29 ENCOUNTER — HOSPITAL ENCOUNTER (OUTPATIENT)
Age: 47
Setting detail: SPECIMEN
Discharge: HOME | End: 2022-07-29

## 2022-07-29 LAB — VANCOMYCIN TROUGH: 19.9 UG/ML (ref 10–20)

## 2022-07-29 PROCEDURE — P9603 ONE-WAY ALLOW PRORATED MILES: HCPCS

## 2022-07-29 PROCEDURE — 36415 COLL VENOUS BLD VENIPUNCTURE: CPT

## 2022-07-29 PROCEDURE — 80202 ASSAY OF VANCOMYCIN: CPT

## 2022-08-01 LAB
CULTURE: NORMAL
SPECIMEN DESCRIPTION: NORMAL

## 2022-08-02 ENCOUNTER — HOSPITAL ENCOUNTER (OUTPATIENT)
Age: 47
Setting detail: SPECIMEN
Discharge: HOME | End: 2022-08-02

## 2022-08-02 LAB
ANION GAP SERPL CALCULATED.3IONS-SCNC: 6 MMOL/L (ref 9–17)
BUN BLDV-MCNC: 32 MG/DL (ref 6–20)
BUN/CREAT BLD: 34 (ref 9–20)
CALCIUM SERPL-MCNC: 8.7 MG/DL (ref 8.6–10.4)
CHLORIDE BLD-SCNC: 96 MMOL/L (ref 98–107)
CO2: 34 MMOL/L (ref 20–31)
CREAT SERPL-MCNC: 0.95 MG/DL (ref 0.7–1.2)
GFR AFRICAN AMERICAN: >60 ML/MIN
GFR NON-AFRICAN AMERICAN: >60 ML/MIN
GFR SERPL CREATININE-BSD FRML MDRD: ABNORMAL ML/MIN/{1.73_M2}
GLUCOSE BLD-MCNC: 309 MG/DL (ref 70–99)
HCT VFR BLD CALC: 30.6 % (ref 40.7–50.3)
HEMOGLOBIN: 8.9 G/DL (ref 13–17)
MCH RBC QN AUTO: 25.3 PG (ref 25.2–33.5)
MCHC RBC AUTO-ENTMCNC: 29.1 G/DL (ref 28.4–34.8)
MCV RBC AUTO: 86.9 FL (ref 82.6–102.9)
NRBC AUTOMATED: 0 PER 100 WBC
PDW BLD-RTO: 17.8 % (ref 11.8–14.4)
PLATELET # BLD: 334 K/UL (ref 138–453)
PMV BLD AUTO: 10.4 FL (ref 8.1–13.5)
POTASSIUM SERPL-SCNC: 3.9 MMOL/L (ref 3.7–5.3)
RBC # BLD: 3.52 M/UL (ref 4.21–5.77)
SODIUM BLD-SCNC: 136 MMOL/L (ref 135–144)
WBC # BLD: 9.4 K/UL (ref 3.5–11.3)

## 2022-08-02 PROCEDURE — 80048 BASIC METABOLIC PNL TOTAL CA: CPT

## 2022-08-02 PROCEDURE — 85027 COMPLETE CBC AUTOMATED: CPT

## 2022-08-02 PROCEDURE — P9603 ONE-WAY ALLOW PRORATED MILES: HCPCS

## 2022-08-19 ENCOUNTER — HOSPITAL ENCOUNTER (EMERGENCY)
Age: 47
Discharge: HOME | End: 2022-08-19
Attending: EMERGENCY MEDICINE
Payer: MEDICARE

## 2022-08-19 VITALS
RESPIRATION RATE: 20 BRPM | TEMPERATURE: 98.4 F | DIASTOLIC BLOOD PRESSURE: 75 MMHG | SYSTOLIC BLOOD PRESSURE: 120 MMHG | HEART RATE: 82 BPM | OXYGEN SATURATION: 98 %

## 2022-08-19 DIAGNOSIS — T83.091A OBSTRUCTION OF FOLEY CATHETER, INITIAL ENCOUNTER (HCC): Primary | ICD-10-CM

## 2022-08-19 PROCEDURE — 99283 EMERGENCY DEPT VISIT LOW MDM: CPT

## 2022-08-19 PROCEDURE — 51702 INSERT TEMP BLADDER CATH: CPT

## 2022-08-19 ASSESSMENT — PAIN DESCRIPTION - LOCATION: LOCATION: ABDOMEN

## 2022-08-19 ASSESSMENT — PAIN DESCRIPTION - DESCRIPTORS: DESCRIPTORS: PRESSURE

## 2022-08-19 ASSESSMENT — PAIN SCALES - GENERAL: PAINLEVEL_OUTOF10: 6

## 2022-08-19 ASSESSMENT — PAIN - FUNCTIONAL ASSESSMENT: PAIN_FUNCTIONAL_ASSESSMENT: 0-10

## 2022-08-19 NOTE — ED PROVIDER NOTES
eMERGENCY dEPARTMENT eNCOUnter        [address] [facility]    Prajapati catheter obstruction      HPI    Alejandro Garza is a 49 y.o. male who presents to ED from home. By E[facility]  With complaint of prajapati catheter obstruction. Onset tonight. Morbidly obese male. Patient was brought to ED for obstructed prajapati catheter. REVIEW OF SYSTEMS    All systems reviewed and positives are in the HPI      PAST MEDICAL HISTORY    Past Medical History:   Diagnosis Date    Arthritis     of the knees    Asthma     Blood circulation, collateral     COPD (chronic obstructive pulmonary disease) (HCC)     Former smoker     Hypertension     Morbid obesity ([address]     ETTA (obstructive sleep apnea)     PAF (paroxysmal atrial fibrillation) [facility]     Pneumonia     Uncontrolled diabetes mellitus with hyperglycemia ([facility]        SURGICAL HISTORY    Past Surgical History:   Procedure Laterality Date    INCISION AND DRAINAGE N/A 12/5/2017    SCROTAL INCISION AND DRAINAGE, DIFFICULT PRAJAPATI INSERTION performed by Maria Victoria Martinez MD at [address] [facility] Right 2012    Wound debridement to R Leg wound    OTHER SURGICAL HISTORY  12/05/2017    I & D scrotum       CURRENT MEDICATIONS    Current Outpatient Rx   Medication Sig Dispense Refill    insulin regular human (HUMULIN R) 500 UNIT/ML concentrated injection vial Inject into the skin      atenolol (TENORMIN) 25 MG tablet Take 50 mg by mouth daily      oxyCODONE-acetaminophen (PERCOCET) 7.5-325 MG per tablet Take 1 tablet by mouth every 8 hours as needed for Pain .  20 tablet 0    aspirin 81 MG EC tablet Take 1 tablet by mouth daily 30 tablet 3    Glucosamine-Chondroitin--300-250 MG TABS Take 2 tablets by mouth 2 times daily      furosemide (LASIX) 40 MG tablet Take 40 mg by mouth 2 times daily      albuterol sulfate  (90 Base) MCG/ACT inhaler Inhale 2 puffs into the lungs every 4 hours as needed for Wheezing      cetirizine-psuedoephedrine (ZYRTEC-D) 5-120 MG per extended release tablet Take 1 catheter is placed. Good return    Labs  Labs Reviewed - No data to display        Summation      Patient Course: 25 [address] Olson catheter changed in ED. ED Medications administered this visit:  Medications - No data to display    New Prescriptions from this visit:    New Prescriptions    No medications on file       Follow-up:  Urologist            Final Impression:   1.  Obstruction of Olson catheter, initial encounter [facility])               (Please note that portions of this note were completed with a voice recognition program.  Efforts were made to edit the dictations but occasionally words are mis-transcribed.)       Jeannine Ramsey MD  08/19/22 7040

## 2022-09-22 ENCOUNTER — HOSPITAL ENCOUNTER (EMERGENCY)
Age: 47
Discharge: HOME | End: 2022-09-23
Attending: EMERGENCY MEDICINE
Payer: MEDICARE

## 2022-09-22 ENCOUNTER — APPOINTMENT (OUTPATIENT)
Dept: GENERAL RADIOLOGY | Age: 47
End: 2022-09-22
Payer: MEDICARE

## 2022-09-22 DIAGNOSIS — L05.91 PILONIDAL CYST: ICD-10-CM

## 2022-09-22 DIAGNOSIS — E66.01 MORBID OBESITY (HCC): ICD-10-CM

## 2022-09-22 DIAGNOSIS — R07.9 CHEST PAIN, UNSPECIFIED TYPE: Primary | ICD-10-CM

## 2022-09-22 DIAGNOSIS — N39.0 URINARY TRACT INFECTION ASSOCIATED WITH CATHETERIZATION OF URINARY TRACT, UNSPECIFIED INDWELLING URINARY CATHETER TYPE, INITIAL ENCOUNTER (HCC): ICD-10-CM

## 2022-09-22 DIAGNOSIS — T83.511A URINARY TRACT INFECTION ASSOCIATED WITH CATHETERIZATION OF URINARY TRACT, UNSPECIFIED INDWELLING URINARY CATHETER TYPE, INITIAL ENCOUNTER (HCC): ICD-10-CM

## 2022-09-22 LAB
-: ABNORMAL
ABSOLUTE EOS #: 2.73 K/UL (ref 0–0.4)
ABSOLUTE LYMPH #: 2.34 K/UL (ref 1–4.8)
ABSOLUTE MONO #: 0.65 K/UL (ref 0–1)
ALBUMIN SERPL-MCNC: 3.2 G/DL (ref 3.5–5.2)
ALP BLD-CCNC: 114 U/L (ref 40–129)
ALT SERPL-CCNC: 11 U/L (ref 5–41)
ANION GAP SERPL CALCULATED.3IONS-SCNC: 7 MMOL/L (ref 9–17)
AST SERPL-CCNC: 20 U/L
BACTERIA: ABNORMAL
BASOPHILS # BLD: 1 % (ref 0–2)
BASOPHILS ABSOLUTE: 0.13 K/UL (ref 0–0.2)
BILIRUB SERPL-MCNC: 0.4 MG/DL (ref 0.3–1.2)
BILIRUBIN URINE: NEGATIVE
BUN BLDV-MCNC: 19 MG/DL (ref 6–20)
CALCIUM SERPL-MCNC: 8.8 MG/DL (ref 8.6–10.4)
CHLORIDE BLD-SCNC: 94 MMOL/L (ref 98–107)
CHP ED QC CHECK: NORMAL
CO2: 33 MMOL/L (ref 20–31)
COLOR: YELLOW
COMMENT UA: ABNORMAL
CREAT SERPL-MCNC: 1.08 MG/DL (ref 0.7–1.2)
EOSINOPHILS RELATIVE PERCENT: 21 % (ref 0–5)
EPITHELIAL CELLS UA: ABNORMAL /HPF
GFR AFRICAN AMERICAN: >60 ML/MIN
GFR NON-AFRICAN AMERICAN: >60 ML/MIN
GFR SERPL CREATININE-BSD FRML MDRD: ABNORMAL ML/MIN/{1.73_M2}
GLUCOSE BLD-MCNC: 101 MG/DL (ref 70–99)
GLUCOSE BLD-MCNC: 77 MG/DL
GLUCOSE BLD-MCNC: 77 MG/DL (ref 65–99)
GLUCOSE URINE: NEGATIVE
HCT VFR BLD CALC: 34.9 % (ref 41–53)
HEMOGLOBIN: 11.3 G/DL (ref 13.5–17.5)
KETONES, URINE: NEGATIVE
LACTIC ACID: 1.1 MMOL/L (ref 0.5–2.2)
LEUKOCYTE ESTERASE, URINE: ABNORMAL
LYMPHOCYTES # BLD: 18 % (ref 13–44)
MCH RBC QN AUTO: 25.6 PG (ref 26–34)
MCHC RBC AUTO-ENTMCNC: 32.4 G/DL (ref 31–37)
MCV RBC AUTO: 79 FL (ref 80–100)
MONOCYTES # BLD: 5 % (ref 5–9)
MORPHOLOGY: ABNORMAL
MORPHOLOGY: ABNORMAL
NITRITE, URINE: POSITIVE
PDW BLD-RTO: 19.5 % (ref 12.1–15.2)
PH UA: 6 (ref 5–8)
PLATELET # BLD: 295 K/UL (ref 140–450)
POTASSIUM SERPL-SCNC: 3.2 MMOL/L (ref 3.7–5.3)
PROTEIN UA: ABNORMAL
RBC # BLD: 4.42 M/UL (ref 4.5–5.9)
RBC UA: ABNORMAL /HPF (ref 0–2)
SEG NEUTROPHILS: 55 % (ref 39–75)
SEGMENTED NEUTROPHILS ABSOLUTE COUNT: 7.15 K/UL (ref 2.1–6.5)
SODIUM BLD-SCNC: 134 MMOL/L (ref 135–144)
SPECIFIC GRAVITY UA: 1.01 (ref 1–1.03)
TOTAL PROTEIN: 9.8 G/DL (ref 6.4–8.3)
TROPONIN, HIGH SENSITIVITY: 21 NG/L (ref 0–22)
TROPONIN, HIGH SENSITIVITY: 22 NG/L (ref 0–22)
TURBIDITY: CLEAR
URINE HGB: ABNORMAL
UROBILINOGEN, URINE: NORMAL
WBC # BLD: 13 K/UL (ref 3.5–11)
WBC UA: ABNORMAL /HPF

## 2022-09-22 PROCEDURE — 81001 URINALYSIS AUTO W/SCOPE: CPT

## 2022-09-22 PROCEDURE — 87086 URINE CULTURE/COLONY COUNT: CPT

## 2022-09-22 PROCEDURE — 99285 EMERGENCY DEPT VISIT HI MDM: CPT

## 2022-09-22 PROCEDURE — 80053 COMPREHEN METABOLIC PANEL: CPT

## 2022-09-22 PROCEDURE — 6370000000 HC RX 637 (ALT 250 FOR IP): Performed by: EMERGENCY MEDICINE

## 2022-09-22 PROCEDURE — 83605 ASSAY OF LACTIC ACID: CPT

## 2022-09-22 PROCEDURE — 6360000002 HC RX W HCPCS: Performed by: EMERGENCY MEDICINE

## 2022-09-22 PROCEDURE — 71045 X-RAY EXAM CHEST 1 VIEW: CPT

## 2022-09-22 PROCEDURE — 93005 ELECTROCARDIOGRAM TRACING: CPT | Performed by: EMERGENCY MEDICINE

## 2022-09-22 PROCEDURE — 36415 COLL VENOUS BLD VENIPUNCTURE: CPT

## 2022-09-22 PROCEDURE — 96374 THER/PROPH/DIAG INJ IV PUSH: CPT

## 2022-09-22 PROCEDURE — 82947 ASSAY GLUCOSE BLOOD QUANT: CPT

## 2022-09-22 PROCEDURE — 84484 ASSAY OF TROPONIN QUANT: CPT

## 2022-09-22 PROCEDURE — 85025 COMPLETE CBC W/AUTO DIFF WBC: CPT

## 2022-09-22 RX ORDER — ATORVASTATIN CALCIUM 40 MG/1
40 TABLET, FILM COATED ORAL DAILY
COMMUNITY
Start: 2022-08-19

## 2022-09-22 RX ORDER — BUMETANIDE 2 MG/1
2 TABLET ORAL DAILY
COMMUNITY
Start: 2022-08-19

## 2022-09-22 RX ORDER — CEPHALEXIN 500 MG/1
500 CAPSULE ORAL 4 TIMES DAILY
Qty: 40 CAPSULE | Refills: 0 | Status: SHIPPED | OUTPATIENT
Start: 2022-09-22 | End: 2022-10-02

## 2022-09-22 RX ORDER — OXYCODONE HYDROCHLORIDE 5 MG/1
5 TABLET ORAL EVERY 4 HOURS PRN
COMMUNITY

## 2022-09-22 RX ORDER — ALLOPURINOL 300 MG/1
300 TABLET ORAL DAILY
COMMUNITY
Start: 2022-08-19

## 2022-09-22 RX ORDER — NITROGLYCERIN 0.4 MG/1
0.4 TABLET SUBLINGUAL
COMMUNITY
Start: 2022-07-13

## 2022-09-22 RX ORDER — KETOROLAC TROMETHAMINE 15 MG/ML
15 INJECTION, SOLUTION INTRAMUSCULAR; INTRAVENOUS ONCE
Status: COMPLETED | OUTPATIENT
Start: 2022-09-22 | End: 2022-09-22

## 2022-09-22 RX ORDER — FAMOTIDINE 20 MG/1
20 TABLET, FILM COATED ORAL DAILY
COMMUNITY
Start: 2022-08-19

## 2022-09-22 RX ORDER — CEPHALEXIN 500 MG/1
500 CAPSULE ORAL 4 TIMES DAILY
Status: DISCONTINUED | OUTPATIENT
Start: 2022-09-22 | End: 2022-09-22

## 2022-09-22 RX ORDER — CEPHALEXIN 500 MG/1
500 CAPSULE ORAL ONCE
Status: COMPLETED | OUTPATIENT
Start: 2022-09-22 | End: 2022-09-22

## 2022-09-22 RX ORDER — POTASSIUM CHLORIDE 750 MG/1
20 TABLET, FILM COATED, EXTENDED RELEASE ORAL ONCE
Status: COMPLETED | OUTPATIENT
Start: 2022-09-22 | End: 2022-09-22

## 2022-09-22 RX ADMIN — CEPHALEXIN 500 MG: 500 CAPSULE ORAL at 17:57

## 2022-09-22 RX ADMIN — POTASSIUM CHLORIDE 20 MEQ: 750 TABLET, FILM COATED, EXTENDED RELEASE ORAL at 18:36

## 2022-09-22 RX ADMIN — KETOROLAC TROMETHAMINE 15 MG: 15 INJECTION, SOLUTION INTRAMUSCULAR; INTRAVENOUS at 20:21

## 2022-09-22 ASSESSMENT — ENCOUNTER SYMPTOMS
COUGH: 0
DIARRHEA: 0
BACK PAIN: 0
WHEEZING: 0
NAUSEA: 0
SHORTNESS OF BREATH: 0
VOMITING: 0
ABDOMINAL PAIN: 0
CHEST TIGHTNESS: 0

## 2022-09-22 ASSESSMENT — PAIN DESCRIPTION - LOCATION: LOCATION: CHEST

## 2022-09-22 ASSESSMENT — PAIN SCALES - GENERAL: PAINLEVEL_OUTOF10: 7

## 2022-09-22 ASSESSMENT — PAIN DESCRIPTION - DESCRIPTORS: DESCRIPTORS: PRESSURE

## 2022-09-22 NOTE — ED PROVIDER NOTES
[facility] ED  eMERGENCY dEPARTMENT eNCOUnter      Pt Name: Kiel Camacho  MRN: [id number]  [place] 1975  Date of evaluation: 9/22/2022  Provider: Bridgett Alejo, [address] [place] [address]     Chief Complaint   Patient presents with    Chest Pain     Pt c/o chest pressure that started approx 90 minutes ago and feels \"pricks going down my arm\"         HISTORY OF PRESENT ILLNESS    Kiel Camacho is a 49 y.o. male who presents to the emergency department from home for chest pain that started after arguing with his mother. Patient is morbidly obese and lives at home with his mother. History of CHF. Admitted back in April for acute CHF. Has had chest pressure on and off for the past 3 weeks. Today's episode started 2 hours ago. Described as a pressure. States that he only told anyone about it because he was being evaluated and checked up on by EMS. He was admitted in April and had a cardioversion after being found to be in A. fib. He is on Eliquis and takes it daily. Patient also concerned he has a pilonidal cyst. Has pain at tailbone and his aid told him it looked red and like he had a cyst.  States he was admitted in April. Unable to do heart cath after being admitted for atrial fibrillation. States they could not do the heart cath due to his obesity. Also discussed possible pacemaker placement but again they felt he was too obese to undergo the surgery. Instead they did a cardioversion which was successful. Triage notes and Nursing notes were reviewed by myself. Any discrepancies are addressed above.     PAST MEDICAL HISTORY     Past Medical History:   Diagnosis Date    Arthritis     of the knees    Asthma     Blood circulation, collateral     COPD (chronic obstructive pulmonary disease) (HCC)     Former smoker     Hypertension     Morbid obesity ([address]     ETTA (obstructive sleep apnea)     PAF (paroxysmal atrial fibrillation) ([address]     Pneumonia     Uncontrolled diabetes mellitus with hyperglycemia ([address]        SURGICAL HISTORY       Past Surgical History:   Procedure Laterality Date    INCISION AND DRAINAGE N/A 12/5/2017    SCROTAL INCISION AND DRAINAGE, DIFFICULT PRAJAPATI INSERTION performed by Angus Chen MD at [address] Right 2012    Wound debridement to R Leg wound    OTHER SURGICAL HISTORY  12/05/2017    I & D scrotum       CURRENT MEDICATIONS       Previous Medications    ALBUTEROL (PROVENTIL) (2.5 MG/3ML) 0.083% NEBULIZER SOLUTION    Take 2.5 mg by nebulization every 4 hours    ALBUTEROL SULFATE  (90 BASE) MCG/ACT INHALER    Inhale 2 puffs into the lungs every 4 hours as needed for Wheezing    ALLOPURINOL (ZYLOPRIM) 300 MG TABLET    Take 300 mg by mouth daily    APIXABAN (ELIQUIS) 5 MG TABS TABLET    Take 5 mg by mouth 2 times daily    ASPIRIN 81 MG EC TABLET    Take 1 tablet by mouth daily    ATORVASTATIN (LIPITOR) 40 MG TABLET    Take 40 mg by mouth daily    BUMETANIDE (BUMEX) 2 MG TABLET    Take 2 mg by mouth daily    CETIRIZINE-PSUEDOEPHEDRINE (ZYRTEC-D) 5-120 MG PER EXTENDED RELEASE TABLET    Take 1 tablet by mouth 2 times daily    FAMOTIDINE (PEPCID) 20 MG TABLET    Take 20 mg by mouth daily    INSULIN REGULAR HUMAN (HUMULIN R U-500) 500 UNIT/ML CONCENTRATED INJECTION VIAL    Inject into the skin    IPRATROPIUM-ALBUTEROL (DUONEB) 0.5-2.5 (3) MG/3ML SOLN NEBULIZER SOLUTION    Inhale 1 vial into the lungs 4 times daily    NITROGLYCERIN (NITROSTAT) 0.4 MG SL TABLET    Place 0.4 mg under the tongue every 5 minutes    OXYCODONE (ROXICODONE) 5 MG IMMEDIATE RELEASE TABLET    Take 5 mg by mouth every 4 hours as needed for Pain.        ALLERGIES     Metoprolol and Levofloxacin    FAMILY HISTORY       Family History   Problem Relation Age of Onset    Asthma Father     Cancer Father     Other Father     Early Death Paternal Grandfather         SOCIAL HISTORY     Social History     Socioeconomic History    Marital status:      Spouse name: None    Number of children: None    Years of education: None    Highest education level: None   Tobacco Use    Smoking status: Former     Packs/day: 1.00     Types: Cigarettes     Start date: 3/25/2015     Quit date: 2017     Years since quittin.2    Smokeless tobacco: Never    Tobacco comments:     cut down to 1/2 ppd 17   Substance and Sexual Activity    Alcohol use: Yes     Comment: 1-2 times per year    Drug use: No    Sexual activity: Not Currently       REVIEW OF SYSTEMS       Review of Systems   Constitutional:  Negative for activity change, chills, diaphoresis, fatigue and fever. Respiratory:  Negative for cough, chest tightness, shortness of breath and wheezing. Cardiovascular:  Positive for chest pain. Negative for palpitations and leg swelling. Gastrointestinal:  Negative for abdominal pain, diarrhea, nausea and vomiting. Genitourinary:  Negative for dysuria, flank pain and frequency. Musculoskeletal:  Negative for back pain, myalgias and neck pain. Skin:  Negative for pallor, rash and wound. Neurological:  Negative for dizziness, syncope, light-headedness and headaches. Except as noted above the remainder of the review of systems was reviewed and is negative. PHYSICAL EXAM    (up to 7 for level 4, 8 or more for level 5)     ED Triage Vitals   BP Temp Temp src Pulse Resp SpO2 Height Weight   -- -- -- -- -- -- -- --       Physical Exam  Vitals and nursing note reviewed. Constitutional:       General: He is not in acute distress. Appearance: He is well-developed. He is obese. He is not ill-appearing, toxic-appearing or diaphoretic. HENT:      Head: Normocephalic and atraumatic. Eyes:      General:         Right eye: No discharge. Left eye: No discharge. Conjunctiva/sclera: Conjunctivae normal.      Pupils: Pupils are equal, round, and reactive to light. Neck:      Trachea: Trachea normal.   Cardiovascular:      Rate and Rhythm: Normal rate and regular rhythm.       Heart sounds: Normal heart sounds. Pulmonary:      Effort: Pulmonary effort is normal. No respiratory distress. Breath sounds: Normal breath sounds. No wheezing or rales. Chest:      Chest wall: No tenderness. Abdominal:      General: Bowel sounds are normal. There is no distension. Palpations: Abdomen is soft. There is no mass. Tenderness: There is no abdominal tenderness. There is no guarding or rebound. Musculoskeletal:         General: No tenderness or deformity. Normal range of motion. Cervical back: Normal range of motion and neck supple. No rigidity. No spinous process tenderness or muscular tenderness. Skin:     General: Skin is warm and dry. Coloration: Skin is not pale. Findings: No erythema or rash. Comments: Rolled patient and no obvious abscess on tailbone. Mild erythema but no fluctuance. Neurological:      Mental Status: He is alert and oriented to person, place, and time. GCS: GCS eye subscore is 4. GCS verbal subscore is 5. GCS motor subscore is 6. Cranial Nerves: No cranial nerve deficit. Sensory: No sensory deficit. Coordination: Coordination normal.      Gait: Gait normal.   Psychiatric:         Behavior: Behavior normal.       DIAGNOSTIC RESULTS     EKG: (none if blank)  All EKG's areinterpreted by the Emergency Department Physician who either signs or Co-signs this chart in the absence of a cardiologist.    Sinus rhythm with right bundle branch block no acute ischemic changes. Similar to previous ekg's. RADIOLOGY: (none if blank)   Interpretationper the Radiologist below, if available at the time of this note:    XR CHEST PORTABLE   Final Result      No acute change.                       LABS:  Labs Reviewed   CBC WITH AUTO DIFFERENTIAL - Abnormal; Notable for the following components:       Result Value    WBC 13.0 (*)     RBC 4.42 (*)     Hemoglobin 11.3 (*)     Hematocrit 34.9 (*)     MCV 79.0 (*)     MCH 25.6 (*)     RDW 19.5 (*)     Eosinophils % 21 (*)     Segs Absolute 7.15 (*)     Absolute Eos # 2.73 (*)     All other components within normal limits   COMPREHENSIVE METABOLIC PANEL - Abnormal; Notable for the following components:    Glucose 101 (*)     Sodium 134 (*)     Potassium 3.2 (*)     Chloride 94 (*)     CO2 33 (*)     Anion Gap 7 (*)     Total Protein 9.8 (*)     Albumin 3.2 (*)     All other components within normal limits   URINALYSIS - Abnormal; Notable for the following components:    Urine Hgb 2+ (*)     Protein, UA 2+ (*)     Nitrite, Urine POSITIVE (*)     Leukocyte Esterase, Urine 3+ (*)     All other components within normal limits   MICROSCOPIC URINALYSIS - Abnormal; Notable for the following components:    Bacteria, UA 3+ (*)     All other components within normal limits   POCT GLUCOSE - Normal   CULTURE, URINE   TROPONIN   LACTIC ACID   TROPONIN   GLUCOSE, WHOLE BLOOD       All other labs were within normal range or not returned as of this dictation. EMERGENCY DEPARTMENT COURSE and Medical Decision Making:     MDM  /   Dilated for chest pain. He has had chest pain for 3 weeks prior to coming in today. Lab work reviewed. Troponin trending downward. Chest x-ray negative. On Eliquis. Unlikely PE. I do not feel this is ACS. EKG is unchanged. Patient insisting that he has a pilonidal cyst.  Had to call [facility] ED for manpower to roll the patient as he is morbidly obese at 545 pounds. No obvious abscess felt on exam.  He does have mild erythema which could be from lying in bed at all times. However there is nothing to drain. With his history of pilonidal cysts, will give referral to general surgery. Will not be able to perform any id in the ED if it were to develop due to inability to roll patient due to morbid obesity and safety reasons. Urinalysis does show signs of infection and he does have a Olson catheter so we will treat him as a complicated UTI with Keflex 4 times daily.   Wound culture was sent and is pending. Analgesia provided. Pain is the location of his tailbone. Because he requires significant manpower patient required to stay in the ED overnight despite already being discharged. Waiting on more manpower from [facility] emergency department for transfer back home in the morning. He remained stable while in the ED. Patient eating and drinking normally. Analgesia provided while here. Recommend he follows up with his regular physician. Given signs and symptoms of when to return to the emergency department. Strict return precautions and follow up instructions were discussed with the patient with which the patient agrees    ED Medications administered this visit:    Medications   cephALEXin (KEFLEX) capsule 500 mg (500 mg Oral Given 9/22/22 1757)   potassium chloride (KLOR-CON) extended release tablet 20 mEq (20 mEq Oral Given 9/22/22 1836)   ketorolac (TORADOL) injection 15 mg (15 mg IntraVENous Given 9/22/22 2021)       CONSULTS: (None if blank)  None    Procedures: (None if blank)       CLINICAL IMPRESSION      1. Chest pain, unspecified type    2. Morbid obesity ([address]    3. Pilonidal cyst    4.  Urinary tract infection associated with catheterization of urinary tract, unspecified indwelling urinary catheter type, initial encounter [facility])          DISPOSITION/PLAN    DISPOSITION Decision To Discharge 09/22/2022 05:34:14 PM      PATIENT REFERRED TO:  Felicitas Angel MD  [address]  [phone]    Schedule an appointment as soon as possible for a visit in 5 days  If symptoms worsen return to [address] [place], [place] [address], [place]  [address]  [phone]    Schedule an appointment as soon as possible for a visit in 7 days  general surgery for history of pilonidal cyst    DISCHARGE MEDICATIONS:  New Prescriptions    CEPHALEXIN (KEFLEX) 500 MG CAPSULE    Take 1 capsule by mouth 4 times daily for 10 days              (Please note that portions of this note were completed with a voicerecognition program.  Efforts were made to edit the dictations but occasionally words are mis-transcribed.)      Lei Marshall DO (electronically signed)  Attending Emergency Department Provider        Lei Marshall DO  09/22/22 6800

## 2022-09-22 NOTE — ED TRIAGE NOTES
Pt brings in bag with home meds, updated accordingly. This RN verifies with verbal recall due to many med changes and pt is poor historian.

## 2022-09-23 VITALS
BODY MASS INDEX: 41.75 KG/M2 | HEIGHT: 73 IN | DIASTOLIC BLOOD PRESSURE: 86 MMHG | RESPIRATION RATE: 16 BRPM | WEIGHT: 315 LBS | OXYGEN SATURATION: 98 % | SYSTOLIC BLOOD PRESSURE: 152 MMHG | TEMPERATURE: 97.7 F | HEART RATE: 99 BPM

## 2022-09-23 LAB
EKG ATRIAL RATE: 90 BPM
EKG P AXIS: 66 DEGREES
EKG P-R INTERVAL: 230 MS
EKG Q-T INTERVAL: 430 MS
EKG QRS DURATION: 166 MS
EKG QTC CALCULATION (BAZETT): 526 MS
EKG R AXIS: -83 DEGREES
EKG T AXIS: 74 DEGREES
EKG VENTRICULAR RATE: 90 BPM
GLUCOSE BLD-MCNC: 175 MG/DL (ref 65–99)

## 2022-09-23 PROCEDURE — 6370000000 HC RX 637 (ALT 250 FOR IP): Performed by: EMERGENCY MEDICINE

## 2022-09-23 PROCEDURE — 82947 ASSAY GLUCOSE BLOOD QUANT: CPT

## 2022-09-23 PROCEDURE — 93010 ELECTROCARDIOGRAM REPORT: CPT | Performed by: INTERNAL MEDICINE

## 2022-09-23 RX ORDER — LIDOCAINE HYDROCHLORIDE 20 MG/ML
JELLY TOPICAL PRN
Status: DISCONTINUED | OUTPATIENT
Start: 2022-09-23 | End: 2022-09-23 | Stop reason: HOSPADM

## 2022-09-23 RX ORDER — CEPHALEXIN 500 MG/1
500 CAPSULE ORAL ONCE
Status: COMPLETED | OUTPATIENT
Start: 2022-09-23 | End: 2022-09-23

## 2022-09-23 RX ADMIN — CEPHALEXIN 500 MG: 500 CAPSULE ORAL at 07:46

## 2022-09-23 RX ADMIN — LIDOCAINE HYDROCHLORIDE: 20 JELLY TOPICAL at 08:59

## 2022-09-23 ASSESSMENT — PAIN SCALES - GENERAL: PAINLEVEL_OUTOF10: 8

## 2022-09-23 ASSESSMENT — PAIN DESCRIPTION - DESCRIPTORS: DESCRIPTORS: PRESSURE

## 2022-09-23 ASSESSMENT — PAIN DESCRIPTION - ORIENTATION: ORIENTATION: MID

## 2022-09-23 ASSESSMENT — PAIN DESCRIPTION - PAIN TYPE: TYPE: CHRONIC PAIN

## 2022-09-23 ASSESSMENT — PAIN DESCRIPTION - LOCATION: LOCATION: BUTTOCKS

## 2022-09-23 NOTE — PROGRESS NOTES
ER nurse calls this nurse as patient is upset asking for the supervisor, this nurse in with [facility] officer to talk with patient, patient upset about having to stay here in the ER all night, this nurse explained to patient we are working with all of our resources to get him home, but unfortunately at this time we are unable to get enough resources to get him home until 7am. This nurse asks patient what we can do to improve his time here, patient voices his concerns and wishes, information relayed to ER staff and physician. Informed patient if he has any other needs/concerns to let us know

## 2022-09-23 NOTE — ED NOTES
All night medications including pain medications taken at this time per patient request. This RN states that all medications need to be placed in personal belongings bag. Pt states he would like to keep Roxicodone at bedside to continue to take q4h, patient educated that pain medication bottle cannot be left at bedside and will be placed in personal bag, patient can call for RN when medication is due again. CRISTI Schaeffer and security Mercado at bedside during conversation.  All medications placed in bag, pain medications placed in zipper compartment of bag per patient request.     Freddy Cota RN  09/22/22 [facility]  09/23/22 0009

## 2022-09-24 LAB
CULTURE: NORMAL
SPECIMEN DESCRIPTION: NORMAL

## 2023-01-13 ENCOUNTER — HOSPITAL ENCOUNTER (OUTPATIENT)
Age: 48
Setting detail: SPECIMEN
Discharge: HOME | End: 2023-01-13
Payer: MEDICARE

## 2023-01-13 LAB
-: ABNORMAL
BACTERIA: ABNORMAL
BILIRUBIN URINE: NEGATIVE
COLOR: YELLOW
COMMENT UA: ABNORMAL
GLUCOSE URINE: NEGATIVE
KETONES, URINE: NEGATIVE
LEUKOCYTE ESTERASE, URINE: ABNORMAL
NITRITE, URINE: POSITIVE
PH UA: 6 (ref 5–8)
PROTEIN UA: ABNORMAL
RBC UA: ABNORMAL /HPF (ref 0–2)
SPECIFIC GRAVITY UA: 1.01 (ref 1–1.03)
TURBIDITY: ABNORMAL
URINE HGB: ABNORMAL
UROBILINOGEN, URINE: NORMAL
WBC UA: ABNORMAL /HPF

## 2023-01-13 PROCEDURE — 87086 URINE CULTURE/COLONY COUNT: CPT

## 2023-01-13 PROCEDURE — 87186 SC STD MICRODIL/AGAR DIL: CPT

## 2023-01-15 LAB
CULTURE: ABNORMAL
CULTURE: ABNORMAL
SPECIMEN DESCRIPTION: ABNORMAL

## 2023-01-31 ENCOUNTER — HOSPITAL ENCOUNTER (OUTPATIENT)
Age: 48
Setting detail: SPECIMEN
Discharge: HOME | End: 2023-01-31
Payer: MEDICARE

## 2023-01-31 LAB
-: ABNORMAL
BACTERIA: ABNORMAL
BILIRUBIN URINE: NEGATIVE
COLOR: YELLOW
COMMENT UA: ABNORMAL
GLUCOSE URINE: NEGATIVE
KETONES, URINE: NEGATIVE
LEUKOCYTE ESTERASE, URINE: ABNORMAL
NITRITE, URINE: NEGATIVE
PH UA: 6 (ref 5–8)
PROTEIN UA: ABNORMAL
RBC UA: ABNORMAL /HPF (ref 0–2)
SPECIFIC GRAVITY UA: 1.01 (ref 1–1.03)
TURBIDITY: ABNORMAL
URINE HGB: ABNORMAL
UROBILINOGEN, URINE: NORMAL
WBC UA: ABNORMAL /HPF

## 2023-01-31 PROCEDURE — 87077 CULTURE AEROBIC IDENTIFY: CPT

## 2023-01-31 PROCEDURE — 87086 URINE CULTURE/COLONY COUNT: CPT

## 2023-01-31 PROCEDURE — 81001 URINALYSIS AUTO W/SCOPE: CPT

## 2023-01-31 PROCEDURE — 87186 SC STD MICRODIL/AGAR DIL: CPT

## 2023-02-03 LAB
MICROORGANISM SPEC CULT: ABNORMAL
SPECIMEN DESCRIPTION: ABNORMAL

## 2023-02-04 ENCOUNTER — HOSPITAL ENCOUNTER (OUTPATIENT)
Age: 48
Setting detail: SPECIMEN
Discharge: HOME | End: 2023-02-04
Payer: MEDICARE

## 2023-02-04 LAB
ABSOLUTE EOS #: 1.66 K/UL (ref 0–0.4)
ABSOLUTE LYMPH #: 0.83 K/UL (ref 1–4.8)
ABSOLUTE MONO #: 0.42 K/UL (ref 0–1)
BASOPHILS # BLD: 1 % (ref 0–2)
BASOPHILS ABSOLUTE: 0.08 K/UL (ref 0–0.2)
EOSINOPHILS RELATIVE PERCENT: 20 % (ref 0–5)
HCT VFR BLD AUTO: 31 % (ref 41–53)
HGB BLD-MCNC: 9.8 G/DL (ref 13.5–17.5)
LYMPHOCYTES # BLD: 10 % (ref 13–44)
MCH RBC QN AUTO: 26.9 PG (ref 26–34)
MCHC RBC AUTO-ENTMCNC: 31.6 G/DL (ref 31–37)
MCV RBC AUTO: 85.1 FL (ref 80–100)
METAMYELOCYTES ABSOLUTE COUNT: 0 K/UL
METAMYELOCYTES: 0 %
MONOCYTES # BLD: 5 % (ref 5–9)
MORPHOLOGY: ABNORMAL
PATIENT FASTING?: NORMAL
PDW BLD-RTO: 19.2 % (ref 12.1–15.2)
PLATELET # BLD AUTO: 281 K/UL (ref 140–450)
RBC # BLD: 3.64 M/UL (ref 4.5–5.9)
REASON FOR REJECTION: NORMAL
SEG NEUTROPHILS: 64 % (ref 39–75)
SEGMENTED NEUTROPHILS ABSOLUTE COUNT: 5.31 K/UL (ref 2.1–6.5)
WBC # BLD AUTO: 8.3 K/UL (ref 3.5–11)
ZZ NTE CLEAN UP: ORDERED TEST: NORMAL
ZZ NTE WITH NAME CLEAN UP: SPECIMEN SOURCE: NORMAL

## 2023-02-04 PROCEDURE — 85025 COMPLETE CBC W/AUTO DIFF WBC: CPT

## 2023-02-09 ENCOUNTER — HOSPITAL ENCOUNTER (OUTPATIENT)
Age: 48
Setting detail: SPECIMEN
Discharge: HOME | End: 2023-02-09
Payer: MEDICARE

## 2023-02-09 LAB
ABSOLUTE EOS #: 1.5 K/UL (ref 0–0.4)
ABSOLUTE LYMPH #: 1.2 K/UL (ref 1–4.8)
ABSOLUTE MONO #: 0.6 K/UL (ref 0–1)
ALBUMIN SERPL-MCNC: 3.1 G/DL (ref 3.5–5.2)
ALP SERPL-CCNC: 215 U/L (ref 40–129)
ALT SERPL-CCNC: 53 U/L (ref 5–41)
ANION GAP SERPL CALCULATED.3IONS-SCNC: 6 MMOL/L (ref 9–17)
AST SERPL-CCNC: 69 U/L
BASOPHILS # BLD: 0 % (ref 0–2)
BASOPHILS ABSOLUTE: 0 K/UL (ref 0–0.2)
BILIRUB SERPL-MCNC: 0.4 MG/DL (ref 0.3–1.2)
BUN SERPL-MCNC: 31 MG/DL (ref 6–20)
BUN/CREAT BLD: 30 (ref 9–20)
CALCIUM SERPL-MCNC: 8.5 MG/DL (ref 8.6–10.4)
CHLORIDE SERPL-SCNC: 92 MMOL/L (ref 98–107)
CO2 SERPL-SCNC: 33 MMOL/L (ref 20–31)
CREAT SERPL-MCNC: 1.04 MG/DL (ref 0.7–1.2)
EOSINOPHILS RELATIVE PERCENT: 15 % (ref 0–5)
GFR SERPL CREATININE-BSD FRML MDRD: >60 ML/MIN/1.73M2
GLUCOSE SERPL-MCNC: 362 MG/DL (ref 70–99)
HCT VFR BLD AUTO: 30.7 % (ref 41–53)
HGB BLD-MCNC: 9.9 G/DL (ref 13.5–17.5)
LYMPHOCYTES # BLD: 13 % (ref 13–44)
MCH RBC QN AUTO: 26.8 PG (ref 26–34)
MCHC RBC AUTO-ENTMCNC: 32.2 G/DL (ref 31–37)
MCV RBC AUTO: 83.2 FL (ref 80–100)
MONOCYTES # BLD: 6 % (ref 5–9)
MORPHOLOGY: ABNORMAL
PDW BLD-RTO: 19.4 % (ref 12.1–15.2)
PLATELET # BLD AUTO: 300 K/UL (ref 140–450)
POTASSIUM SERPL-SCNC: 4.4 MMOL/L (ref 3.7–5.3)
PROT SERPL-MCNC: 9.1 G/DL (ref 6.4–8.3)
RBC # BLD: 3.69 M/UL (ref 4.5–5.9)
SEG NEUTROPHILS: 66 % (ref 39–75)
SEGMENTED NEUTROPHILS ABSOLUTE COUNT: 6.5 K/UL (ref 2.1–6.5)
SODIUM SERPL-SCNC: 131 MMOL/L (ref 135–144)
WBC # BLD AUTO: 9.8 K/UL (ref 3.5–11)

## 2023-02-09 PROCEDURE — 85025 COMPLETE CBC W/AUTO DIFF WBC: CPT

## 2023-02-09 PROCEDURE — 80053 COMPREHEN METABOLIC PANEL: CPT

## 2023-03-23 ENCOUNTER — HOSPITAL ENCOUNTER (OUTPATIENT)
Age: 48
Setting detail: SPECIMEN
Discharge: HOME | End: 2023-03-23

## 2023-05-18 ENCOUNTER — HOSPITAL ENCOUNTER (OUTPATIENT)
Age: 48
Setting detail: SPECIMEN
Discharge: HOME | End: 2023-05-18
Payer: MEDICARE

## 2023-05-18 LAB
ANION GAP SERPL CALCULATED.3IONS-SCNC: 5 MMOL/L (ref 9–17)
BUN SERPL-MCNC: 35 MG/DL (ref 6–20)
BUN/CREAT SERPL: 31 (ref 9–20)
CALCIUM SERPL-MCNC: 8.3 MG/DL (ref 8.6–10.4)
CHLORIDE SERPL-SCNC: 94 MMOL/L (ref 98–107)
CO2 SERPL-SCNC: 30 MMOL/L (ref 20–31)
CREAT SERPL-MCNC: 1.12 MG/DL (ref 0.7–1.2)
GFR SERPL CREATININE-BSD FRML MDRD: >60 ML/MIN/1.73M2
GLUCOSE SERPL-MCNC: 132 MG/DL (ref 70–99)
POTASSIUM SERPL-SCNC: 3.5 MMOL/L (ref 3.7–5.3)
SODIUM SERPL-SCNC: 129 MMOL/L (ref 135–144)

## 2023-05-18 PROCEDURE — 80048 BASIC METABOLIC PNL TOTAL CA: CPT

## (undated) DEVICE — SPONGE LAP W18XL18IN WHT COT 4 PLY FLD STRUNG RADPQ DISP ST

## (undated) DEVICE — SUTURE CHROMIC GUT SZ 3-0 L27IN ABSRB BRN L26MM SH 1/2 CIR G122H

## (undated) DEVICE — PAD,ABDOMINAL,5"X9",ST,LF,25/BX: Brand: MEDLINE INDUSTRIES, INC.

## (undated) DEVICE — COVER OR TBL W40XL90IN ABSRB STD AND GRIPPY BK SAHARA

## (undated) DEVICE — PENCIL ES L3M BTTN SWCH HOLSTER W/ BLDE ELECTRD EDGE

## (undated) DEVICE — MEDI-VAC SUCTION HANDLE REGULAR CAPACITY: Brand: CARDINAL HEALTH

## (undated) DEVICE — INTENDED FOR TISSUE SEPARATION, AND OTHER PROCEDURES THAT REQUIRE A SHARP SURGICAL BLADE TO PUNCTURE OR CUT.: Brand: BARD-PARKER ® CARBON RIB-BACK BLADES

## (undated) DEVICE — MEDI-VAC NON-CONDUCTIVE SUCTION TUBING 7MM X 6.1M (20 FT.) L: Brand: CARDINAL HEALTH

## (undated) DEVICE — GAUZE,PACKING STRIP,IODOFORM,1/2"X5YD,ST: Brand: CURAD

## (undated) DEVICE — BANDAGE,GAUZE,BULKEE II,4.5"X4.1YD,STRL: Brand: MEDLINE

## (undated) DEVICE — PACK PROCEDURE SURG CYSTO SVMMC LF

## (undated) DEVICE — CULTURETTE AERO/ANEROBIC RED/BLUE BUNDLE

## (undated) DEVICE — GOWN,SURGICAL,AURORA,SLEEVE: Brand: MEDLINE

## (undated) DEVICE — GLOVE,EXAM,NITRILE,RESTORE,OAT SENSE,L: Brand: MEDLINE

## (undated) DEVICE — GOWN,AURORA,NONREINFORCED,LARGE: Brand: MEDLINE

## (undated) DEVICE — TOWEL,OR,DSP,ST,NATURAL,DLX,4/PK,20PK/CS: Brand: MEDLINE

## (undated) DEVICE — DRAPE,REIN 53X77,STERILE: Brand: MEDLINE

## (undated) DEVICE — COVER LT HNDL BLU PLAS

## (undated) DEVICE — TRAY CATHETER 16FR F INCLUDE BARDX IC COMPLT CARE DRNGE BG

## (undated) DEVICE — SYRINGE IRRIG 60ML SFT PLIABLE BLB EZ TO GRP 1 HND USE W/